# Patient Record
Sex: FEMALE | Race: WHITE | Employment: OTHER | URBAN - METROPOLITAN AREA
[De-identification: names, ages, dates, MRNs, and addresses within clinical notes are randomized per-mention and may not be internally consistent; named-entity substitution may affect disease eponyms.]

---

## 2017-01-25 ENCOUNTER — HOSPITAL ENCOUNTER (OUTPATIENT)
Dept: LAB | Age: 67
Discharge: HOME | End: 2017-01-25
Attending: ORTHOPAEDIC SURGERY
Payer: MEDICARE

## 2017-01-25 LAB — HGB BLD-MCNC: 13.3 G/DL (ref 11.7–15.4)

## 2017-01-25 PROCEDURE — 36415 COLL VENOUS BLD VENIPUNCTURE: CPT | Performed by: ORTHOPAEDIC SURGERY

## 2017-01-25 PROCEDURE — 85018 HEMOGLOBIN: CPT | Performed by: ORTHOPAEDIC SURGERY

## 2017-01-27 ENCOUNTER — ANESTHESIA EVENT (OUTPATIENT)
Dept: SURGERY | Age: 67
End: 2017-01-27
Payer: MEDICARE

## 2017-01-27 RX ORDER — FLUMAZENIL 0.1 MG/ML
0.2 INJECTION INTRAVENOUS
Status: CANCELLED | OUTPATIENT
Start: 2017-01-27

## 2017-01-27 RX ORDER — ONDANSETRON 2 MG/ML
4 INJECTION INTRAMUSCULAR; INTRAVENOUS
Status: CANCELLED | OUTPATIENT
Start: 2017-01-27

## 2017-01-27 RX ORDER — NALBUPHINE HYDROCHLORIDE 20 MG/ML
5 INJECTION, SOLUTION INTRAMUSCULAR; INTRAVENOUS; SUBCUTANEOUS
Status: CANCELLED | OUTPATIENT
Start: 2017-01-27

## 2017-01-27 RX ORDER — HYDROMORPHONE HYDROCHLORIDE 2 MG/ML
0.5 INJECTION, SOLUTION INTRAMUSCULAR; INTRAVENOUS; SUBCUTANEOUS
Status: CANCELLED | OUTPATIENT
Start: 2017-01-27

## 2017-01-27 RX ORDER — SODIUM CHLORIDE 0.9 % (FLUSH) 0.9 %
5-10 SYRINGE (ML) INJECTION AS NEEDED
Status: CANCELLED | OUTPATIENT
Start: 2017-01-27

## 2017-01-27 RX ORDER — NALOXONE HYDROCHLORIDE 0.4 MG/ML
0.1 INJECTION, SOLUTION INTRAMUSCULAR; INTRAVENOUS; SUBCUTANEOUS
Status: CANCELLED | OUTPATIENT
Start: 2017-01-27

## 2017-01-27 RX ORDER — OXYCODONE HYDROCHLORIDE 5 MG/1
5 TABLET ORAL
Status: CANCELLED | OUTPATIENT
Start: 2017-01-27

## 2017-01-27 RX ORDER — MIDAZOLAM HYDROCHLORIDE 1 MG/ML
2 INJECTION, SOLUTION INTRAMUSCULAR; INTRAVENOUS
Status: CANCELLED | OUTPATIENT
Start: 2017-01-27

## 2017-01-30 ENCOUNTER — APPOINTMENT (OUTPATIENT)
Dept: GENERAL RADIOLOGY | Age: 67
End: 2017-01-30
Attending: ORTHOPAEDIC SURGERY
Payer: MEDICARE

## 2017-01-30 ENCOUNTER — HOSPITAL ENCOUNTER (OUTPATIENT)
Age: 67
Setting detail: OUTPATIENT SURGERY
Discharge: HOME | End: 2017-01-30
Attending: ORTHOPAEDIC SURGERY | Admitting: ORTHOPAEDIC SURGERY
Payer: MEDICARE

## 2017-01-30 ENCOUNTER — SURGERY (OUTPATIENT)
Age: 67
End: 2017-01-30

## 2017-01-30 ENCOUNTER — ANESTHESIA (OUTPATIENT)
Dept: SURGERY | Age: 67
End: 2017-01-30
Payer: MEDICARE

## 2017-01-30 VITALS
TEMPERATURE: 97.4 F | SYSTOLIC BLOOD PRESSURE: 124 MMHG | RESPIRATION RATE: 16 BRPM | BODY MASS INDEX: 20.57 KG/M2 | HEIGHT: 66 IN | HEART RATE: 65 BPM | WEIGHT: 128 LBS | DIASTOLIC BLOOD PRESSURE: 59 MMHG | OXYGEN SATURATION: 95 %

## 2017-01-30 PROCEDURE — 77030010507 HC ADH SKN DERMBND J&J -B: Performed by: ORTHOPAEDIC SURGERY

## 2017-01-30 PROCEDURE — C1713 ANCHOR/SCREW BN/BN,TIS/BN: HCPCS | Performed by: ORTHOPAEDIC SURGERY

## 2017-01-30 PROCEDURE — 77030016570 HC BLNKT BAIR HGGR 3M -B: Performed by: ANESTHESIOLOGY

## 2017-01-30 PROCEDURE — 77030029883 HC RETRV SUT ARTHSCP HOFFE BEAT -B: Performed by: ORTHOPAEDIC SURGERY

## 2017-01-30 PROCEDURE — 76210000020 HC REC RM PH II FIRST 0.5 HR: Performed by: ORTHOPAEDIC SURGERY

## 2017-01-30 PROCEDURE — 77030002912 HC SUT ETHBND J&J -A: Performed by: ORTHOPAEDIC SURGERY

## 2017-01-30 PROCEDURE — 76210000063 HC OR PH I REC FIRST 0.5 HR: Performed by: ORTHOPAEDIC SURGERY

## 2017-01-30 PROCEDURE — 77030013392 HC CAP PROTCT PIN JRGN -B: Performed by: ORTHOPAEDIC SURGERY

## 2017-01-30 PROCEDURE — 77030002933 HC SUT MCRYL J&J -A: Performed by: ORTHOPAEDIC SURGERY

## 2017-01-30 PROCEDURE — 77030006690 HC BLD OPHTH BVR BD -B: Performed by: ORTHOPAEDIC SURGERY

## 2017-01-30 PROCEDURE — 77030003602 HC NDL NRV BLK BBMI -B: Performed by: ANESTHESIOLOGY

## 2017-01-30 PROCEDURE — 77030011884 HC TAPE CST PLSTR BSNM -A: Performed by: ORTHOPAEDIC SURGERY

## 2017-01-30 PROCEDURE — 74011250636 HC RX REV CODE- 250/636

## 2017-01-30 PROCEDURE — 74011250636 HC RX REV CODE- 250/636: Performed by: ORTHOPAEDIC SURGERY

## 2017-01-30 PROCEDURE — 77030006689 HC BLD OPHTH BVR BD -A: Performed by: ORTHOPAEDIC SURGERY

## 2017-01-30 PROCEDURE — 76010000162 HC OR TIME 1.5 TO 2 HR INTENSV-TIER 1: Performed by: ORTHOPAEDIC SURGERY

## 2017-01-30 PROCEDURE — 74011000250 HC RX REV CODE- 250

## 2017-01-30 PROCEDURE — 76942 ECHO GUIDE FOR BIOPSY: CPT | Performed by: ORTHOPAEDIC SURGERY

## 2017-01-30 PROCEDURE — 74011250636 HC RX REV CODE- 250/636: Performed by: ANESTHESIOLOGY

## 2017-01-30 PROCEDURE — 76060000034 HC ANESTHESIA 1.5 TO 2 HR: Performed by: ORTHOPAEDIC SURGERY

## 2017-01-30 PROCEDURE — 77030002986 HC SUT PROL J&J -A: Performed by: ORTHOPAEDIC SURGERY

## 2017-01-30 PROCEDURE — 76010010054 HC POST OP PAIN BLOCK: Performed by: ORTHOPAEDIC SURGERY

## 2017-01-30 PROCEDURE — 77030008593 HC TRAP FNGR MESH ALLN -B: Performed by: ORTHOPAEDIC SURGERY

## 2017-01-30 PROCEDURE — 77030036550 HC SLNG ARM PCH S2SG -A: Performed by: ORTHOPAEDIC SURGERY

## 2017-01-30 PROCEDURE — 77030033681 HC SPLNT P-CUT SAF BSNM -A: Performed by: ORTHOPAEDIC SURGERY

## 2017-01-30 PROCEDURE — 77030002916 HC SUT ETHLN J&J -A: Performed by: ORTHOPAEDIC SURGERY

## 2017-01-30 PROCEDURE — 77030020754 HC CUF TRNQT 2BLA STRY -B: Performed by: ORTHOPAEDIC SURGERY

## 2017-01-30 PROCEDURE — 77030018836 HC SOL IRR NACL ICUM -A: Performed by: ORTHOPAEDIC SURGERY

## 2017-01-30 DEVICE — IMPLANTABLE DEVICE: Type: IMPLANTABLE DEVICE | Site: THUMB | Status: FUNCTIONAL

## 2017-01-30 RX ORDER — SODIUM CHLORIDE 0.9 % (FLUSH) 0.9 %
5-10 SYRINGE (ML) INJECTION AS NEEDED
Status: DISCONTINUED | OUTPATIENT
Start: 2017-01-30 | End: 2017-01-30 | Stop reason: HOSPADM

## 2017-01-30 RX ORDER — LIDOCAINE HYDROCHLORIDE 20 MG/ML
INJECTION, SOLUTION EPIDURAL; INFILTRATION; INTRACAUDAL; PERINEURAL AS NEEDED
Status: DISCONTINUED | OUTPATIENT
Start: 2017-01-30 | End: 2017-01-30 | Stop reason: HOSPADM

## 2017-01-30 RX ORDER — LIDOCAINE HYDROCHLORIDE 10 MG/ML
0.1 INJECTION INFILTRATION; PERINEURAL AS NEEDED
Status: DISCONTINUED | OUTPATIENT
Start: 2017-01-30 | End: 2017-01-30 | Stop reason: HOSPADM

## 2017-01-30 RX ORDER — SODIUM CHLORIDE 0.9 % (FLUSH) 0.9 %
5-10 SYRINGE (ML) INJECTION EVERY 8 HOURS
Status: DISCONTINUED | OUTPATIENT
Start: 2017-01-30 | End: 2017-01-30 | Stop reason: HOSPADM

## 2017-01-30 RX ORDER — ONDANSETRON 2 MG/ML
INJECTION INTRAMUSCULAR; INTRAVENOUS AS NEEDED
Status: DISCONTINUED | OUTPATIENT
Start: 2017-01-30 | End: 2017-01-30 | Stop reason: HOSPADM

## 2017-01-30 RX ORDER — PROPOFOL 10 MG/ML
INJECTION, EMULSION INTRAVENOUS
Status: DISCONTINUED | OUTPATIENT
Start: 2017-01-30 | End: 2017-01-30 | Stop reason: HOSPADM

## 2017-01-30 RX ORDER — MIDAZOLAM HYDROCHLORIDE 1 MG/ML
2 INJECTION, SOLUTION INTRAMUSCULAR; INTRAVENOUS ONCE
Status: COMPLETED | OUTPATIENT
Start: 2017-01-30 | End: 2017-01-30

## 2017-01-30 RX ORDER — CEFAZOLIN SODIUM IN 0.9 % NACL 2 G/50 ML
2 INTRAVENOUS SOLUTION, PIGGYBACK (ML) INTRAVENOUS ONCE
Status: COMPLETED | OUTPATIENT
Start: 2017-01-30 | End: 2017-01-30

## 2017-01-30 RX ORDER — FENTANYL CITRATE 50 UG/ML
100 INJECTION, SOLUTION INTRAMUSCULAR; INTRAVENOUS ONCE
Status: COMPLETED | OUTPATIENT
Start: 2017-01-30 | End: 2017-01-30

## 2017-01-30 RX ORDER — SODIUM CHLORIDE, SODIUM LACTATE, POTASSIUM CHLORIDE, CALCIUM CHLORIDE 600; 310; 30; 20 MG/100ML; MG/100ML; MG/100ML; MG/100ML
100 INJECTION, SOLUTION INTRAVENOUS CONTINUOUS
Status: DISCONTINUED | OUTPATIENT
Start: 2017-01-30 | End: 2017-01-30 | Stop reason: HOSPADM

## 2017-01-30 RX ORDER — PROPOFOL 10 MG/ML
INJECTION, EMULSION INTRAVENOUS AS NEEDED
Status: DISCONTINUED | OUTPATIENT
Start: 2017-01-30 | End: 2017-01-30 | Stop reason: HOSPADM

## 2017-01-30 RX ADMIN — ONDANSETRON 4 MG: 2 INJECTION INTRAMUSCULAR; INTRAVENOUS at 10:15

## 2017-01-30 RX ADMIN — PROPOFOL 50 MG: 10 INJECTION, EMULSION INTRAVENOUS at 09:24

## 2017-01-30 RX ADMIN — CEFAZOLIN 2 G: 1 INJECTION, POWDER, FOR SOLUTION INTRAMUSCULAR; INTRAVENOUS; PARENTERAL at 09:25

## 2017-01-30 RX ADMIN — PROPOFOL 100 MCG/KG/MIN: 10 INJECTION, EMULSION INTRAVENOUS at 09:24

## 2017-01-30 RX ADMIN — MIDAZOLAM HYDROCHLORIDE 2 MG: 1 INJECTION, SOLUTION INTRAMUSCULAR; INTRAVENOUS at 08:46

## 2017-01-30 RX ADMIN — FENTANYL CITRATE 50 MCG: 50 INJECTION, SOLUTION INTRAMUSCULAR; INTRAVENOUS at 08:46

## 2017-01-30 RX ADMIN — SODIUM CHLORIDE, SODIUM LACTATE, POTASSIUM CHLORIDE, AND CALCIUM CHLORIDE 100 ML/HR: 600; 310; 30; 20 INJECTION, SOLUTION INTRAVENOUS at 07:45

## 2017-01-30 RX ADMIN — LIDOCAINE HYDROCHLORIDE 40 MG: 20 INJECTION, SOLUTION EPIDURAL; INFILTRATION; INTRACAUDAL; PERINEURAL at 09:24

## 2017-01-30 RX ADMIN — PROPOFOL 20 MG: 10 INJECTION, EMULSION INTRAVENOUS at 09:37

## 2017-01-30 NOTE — ANESTHESIA PROCEDURE NOTES
Peripheral Block    Start time: 1/30/2017 8:49 AM  End time: 1/30/2017 8:50 AM  Performed by: David Vaughn  Authorized by: David Vaughn       Pre-procedure:    Indications: at surgeon's request and post-op pain management    Preanesthetic Checklist: patient identified, risks and benefits discussed, site marked, timeout performed, anesthesia consent given and patient being monitored    Timeout Time: 08:46          Block Type:   Block Type:  Axillary  Laterality:  Left  Monitoring:  Standard ASA monitoring, continuous pulse ox, frequent vital sign checks, heart rate, responsive to questions and oxygen  Injection Technique:  Single shot  Procedures: ultrasound guided and nerve stimulator    Patient Position: seated  Prep: chlorhexidine    Location:  Axilla  Needle Type:  Stimuplex  Needle Gauge:  22 G  Needle Localization:  Nerve stimulator and ultrasound guidance  Motor Response: minimal motor response >0.4 mA    Medication Injected:  0.5%  ropivacaine  Adds:  Epi 1:200K  Volume (mL):  30    Assessment:  Number of attempts:  1  Injection Assessment:  Incremental injection every 5 mL, local visualized surrounding nerve on ultrasound, negative aspiration for CSF, negative aspiration for blood, no paresthesia, no intravascular symptoms and ultrasound image on chart  Patient tolerance:  Patient tolerated the procedure well with no immediate complications

## 2017-01-30 NOTE — IP AVS SNAPSHOT
[address] 
[phone] Patient: Ursula Bianchi MRN: [id number] KTR:[id number]/72/[id number] You are allergic to the following Allergen Reactions Pcn (Penicillins) Hives Sulfa (Sulfonamide Antibiotics) Hives Recent Documentation Height Weight BMI OB Status Smoking Status 1.676 m 58.1 kg 20.66 kg/m2 Postmenopausal Never Smoker Emergency Contacts Name Discharge Info Relation Home Work Mobile Adalberto Rocha  Spouse [3] [phone] About your hospitalization You were admitted on:  January 30, 2017 You last received care in the:  [facility] OP PACU You were discharged on:  January 30, 2017 Unit phone number:  [phone] Why you were hospitalized Your primary diagnosis was:  Not on File Providers Seen During Your Hospitalizations Provider Role Specialty Primary office phone [place] MD Francis Attending Provider Orthopedic Surgery [phone] Your Primary Care Physician (PCP) Primary Care Physician Office Phone Office Fax [facility] [phone] Follow-up Information Follow up With Details Comments Contact Info Cali Goins MD   [address] [place] [id number] [phone] Your Appointments Tuesday February 21, 2017  9:00 AM EST  
SC OT INITIAL VISIT HAND with Tiffany Camacho OT  
[facility] ([address] [place] [address]) [address] [place] [id number] [phone] Please arrive 10-15 minutes prior to your appointment time. Wear comfortable clothing. Please bring your insurance cards with you. Please bring a list of your medications including herbal supplements. Please bring a list of your allergies including food allergies. Monday February 27, 2017  1:15 PM EST New Patient with Enriqueta Walters MD  
 [facility] [address] [facility]) [address]  
[phone] Friday March 10, 2017  9:30 AM EST  
DEXA BONE DENSITY STDY AXIAL with S[facility] DEXA BI GE LUNAR DEXA [address] [facility] ([facility] [facility] [address]) [address] [place] [address]  
[phone] MEDICATIONS -  Patient must bring a complete list of all medications currently taking to include prescriptions, over-the-counter meds, herbals, vitamins & any dietary supplements -  Patient must discontinue use of calcium, vitamins, or calcium supplements including antacids (calcium based) 24 hours before scan. GENERAL INSTRUCTIONS - Men should wear a jogging suit with NO metal.  Women should wear a sports bra with NO metal as well as clothes with no metal or buttons. PATIENT ARRIVAL -  Please report 15 minutes early to check in  
  
    
  
  
 
  
  
  
Current Discharge Medication List  
  
CONTINUE these medications which have CHANGED Dose & Instructions Dispensing Information Comments Morning Noon Evening Bedtime ALPRAZolam 0.5 mg tablet Commonly known as:  Rebbeca Lawn What changed:  when to take this Your next dose is: Today, Tomorrow Other:  _________ Dose:  0.5 mg Take 1 Tab by mouth nightly as needed for Anxiety. Max Daily Amount: 0.5 mg.  
 Quantity:  90 Tab Refills:  1  
     
   
   
   
  
 nabumetone 500 mg tablet Commonly known as:  RELAFEN What changed:   
- when to take this 
- reasons to take this 
- additional instructions Your next dose is: Today, Tomorrow Other:  _________ Dose:  500 mg Take 1 Tab by mouth two (2) times a day. Take with food for arthritis pain  Indications: OSTEOARTHRITIS Quantity:  180 Tab Refills:  4  
     
   
   
   
  
 sertraline 100 mg tablet Commonly known as:  ZOLOFT What changed:  when to take this Your next dose is: Today, Tomorrow Other:  _________ Dose:  100 mg Take 1 Tab by mouth daily. Indications: ANXIETY WITH DEPRESSION Quantity:  90 Tab Refills:  3 CONTINUE these medications which have NOT CHANGED Dose & Instructions Dispensing Information Comments Morning Noon Evening Bedtime  
 acyclovir 400 mg tablet Commonly known as:  ZOVIRAX Your next dose is: Today, Tomorrow Other:  _________ Dose:  400 mg Take 1 Tab by mouth two (2) times a day. Quantity:  180 Tab Refills:  3  
     
   
   
   
  
 alendronate 70 mg tablet Commonly known as:  FOSAMAX Your next dose is: Today, Tomorrow Other:  _________ Dose:  70 mg Take 1 Tab by mouth every seven (7) days. Quantity:  12 Tab Refills:  3 CALCIUM 600 + D 600-125 mg-unit Tab Generic drug:  calcium-cholecalciferol (d3) Your next dose is: Today, Tomorrow Other:  _________ Take  by mouth daily. Hold all vitamins for 7 days prior to surgery per anesthesia protocol Refills:  0  
     
   
   
   
  
 cholecalciferol 1,000 unit Cap Commonly known as:  VITAMIN D3 Your next dose is: Today, Tomorrow Other:  _________ Dose:  1000 Units Take 1,000 Units by mouth two (2) times a day. Hold all vitamins for 7 days prior to surgery per anesthesia protocol Refills:  0 Iron 325 mg (65 mg iron) tablet Generic drug:  ferrous sulfate Your next dose is: Today, Tomorrow Other:  _________ Dose:  325 mg Take 325 mg by mouth Daily (before breakfast). Refills:  0  
     
   
   
   
  
 levothyroxine 88 mcg tablet Commonly known as:  SYNTHROID Your next dose is: Today, Tomorrow Other:  _________ Dose:  88 mcg Take 1 Tab by mouth Daily (before breakfast). Indications: hypothyroidism Quantity:  90 Tab Refills:  4 Discharge Instructions ACTIVITY · As tolerated and as directed by your doctor. · Bathe or shower as directed by your doctor. DIET · Clear liquids until no nausea or vomiting; then light diet for the first day. · Advance to regular diet on second day, unless your doctor orders otherwise. · If nausea and vomiting continues, call your doctor. PAIN 
· Take pain medication as directed by your doctor. · Call your doctor if pain is NOT relieved by medication. · DO NOT take aspirin of blood thinners unless directed by your doctor. DRESSING CARE  
 
 
 
YOUR DOCTOR'S PHONE NUMBER #[phone] DISCHARGE SUMMARY from Nurse PATIENT INSTRUCTIONS: 
 
After general anesthesia or intravenous sedation, for 24 hours or while taking prescription Narcotics: · Limit your activities · Do not drive and operate hazardous machinery · Do not make important personal or business decisions · Do  not drink alcoholic beverages · If you have not urinated within 8 hours after discharge, please contact your surgeon on call. *  Please give a list of your current medications to your Primary Care Provider. *  Please update this list whenever your medications are discontinued, doses are 
    changed, or new medications (including over-the-counter products) are added. *  Please carry medication information at all times in case of emergency situations. These are general instructions for a healthy lifestyle: No smoking/ No tobacco products/ Avoid exposure to second hand smoke Surgeon General's Warning:  Quitting smoking now greatly reduces serious risk to your health. Obesity, smoking, and sedentary lifestyle greatly increases your risk for illness A healthy diet, regular physical exercise & weight monitoring are important for maintaining a healthy lifestyle You may be retaining fluid if you have a history of heart failure or if you experience any of the following symptoms:  Weight gain of 3 pounds or more overnight or 5 pounds in a week, increased swelling in our hands or feet or shortness of breath while lying flat in bed. Please call your doctor as soon as you notice any of these symptoms; do not wait until your next office visit. Recognize signs and symptoms of STROKE: 
 
F-face looks uneven A-arms unable to move or move unevenly S-speech slurred or non-existent T-time-call 911 as soon as signs and symptoms begin-DO NOT go Back to bed or wait to see if you get better-TIME IS BRAIN. Keep splint clean, dry and intact until see in office. Do not scrub incision or submerge under water. Move fingers, elevate, and ice to prevent swelling. No heavy lifting. Discharge Orders None Introducing [facility]! Dear Carrie Pineda: 
Thank you for requesting a [employer] account. Our records indicate that you already have an active Mapittrack[employer] account. You can access your account anytime at [email or web address] Did you know that you can access your hospital and ER discharge instructions at any time in Mapittrackit? You can also review all of your test results from your hospital stay or ER visit. Additional Information If you have questions, please visit the Frequently Asked Questions section of the [employer] website at [email or web address] Remember, MyChart is NOT to be used for urgent needs. For medical emergencies, dial 911. Now available from your iPhone and Android! General Information Please provide this summary of care documentation to your next provider. Patient Signature:  ____________________________________________________________ Date:  ____________________________________________________________  
  
Lompico Candelaria Provider Signature:  ____________________________________________________________ Date:  ____________________________________________________________

## 2017-01-30 NOTE — ANESTHESIA POSTPROCEDURE EVALUATION
Post-Anesthesia Evaluation and Assessment    Patient: Dmitry Vargas MRN: [id number]  SSN: [id number]    YOB: 1950  Age: 78 y.o. Sex: female       Cardiovascular Function/Vital Signs  Visit Vitals    /59    Pulse 65    Temp 36.3 °C (97.4 °F)    Resp 16    Ht 5' 6\" (1.676 m)    Wt 58.1 kg (128 lb)    SpO2 95%    BMI 20.66 kg/m2       Patient is status post total IV anesthesia, regional anesthesia for Procedure(s):  LEFT THUMB ARTHROPLASTY / PLEXUS. Nausea/Vomiting: None    Postoperative hydration reviewed and adequate. Pain:  Pain Scale 1: Numeric (0 - 10) (01/30/17 1125)  Pain Intensity 1: 0 (01/30/17 1125)   Managed    Neurological Status:   Neuro (WDL): Within Defined Limits (01/30/17 1125)   At baseline    Mental Status and Level of Consciousness: Arousable    Pulmonary Status:   O2 Device: Room air (01/30/17 1125)   Adequate oxygenation and airway patent    Complications related to anesthesia: None    Post-anesthesia assessment completed.  No concerns    Signed By: Vilma Zimmerman MD     January 30, 2017

## 2017-01-30 NOTE — ANESTHESIA PREPROCEDURE EVALUATION
Anesthetic History   No history of anesthetic complications            Review of Systems / Medical History  Patient summary reviewed and pertinent labs reviewed    Pulmonary  Within defined limits                 Neuro/Psych   Within defined limits           Cardiovascular              Hyperlipidemia    Exercise tolerance: >4 METS     GI/Hepatic/Renal  Within defined limits              Endo/Other      Hypothyroidism: well controlled  Arthritis     Other Findings              Physical Exam    Airway  Mallampati: II  TM Distance: < 4 cm  Neck ROM: normal range of motion   Mouth opening: Normal     Cardiovascular  Regular rate and rhythm,  S1 and S2 normal,  no murmur, click, rub, or gallop  Rhythm: regular  Rate: normal         Dental  No notable dental hx       Pulmonary  Breath sounds clear to auscultation               Abdominal  GI exam deferred       Other Findings            Anesthetic Plan    ASA: 2  Anesthesia type: total IV anesthesia and regional - brachial plexus block      Post-op pain plan if not by surgeon: peripheral nerve block single    Induction: Intravenous  Anesthetic plan and risks discussed with: Patient and Spouse

## 2017-01-30 NOTE — DISCHARGE INSTRUCTIONS
ACTIVITY  · As tolerated and as directed by your doctor. · Bathe or shower as directed by your doctor. DIET  · Clear liquids until no nausea or vomiting; then light diet for the first day. · Advance to regular diet on second day, unless your doctor orders otherwise. · If nausea and vomiting continues, call your doctor. PAIN  · Take pain medication as directed by your doctor. · Call your doctor if pain is NOT relieved by medication. · DO NOT take aspirin of blood thinners unless directed by your doctor. DRESSING CARE       CALL YOUR DOCTOR IF   · Excessive bleeding that does not stop after holding pressure over the area  · Temperature of 101 degrees F or above  · Excessive redness, swelling or bruising, and/ or green or yellow, smelly discharge from incision    AFTER ANESTHESIA   · For the first 24 hours: DO NOT Drive, Drink alcoholic beverages, or Make important decisions. · Be aware of dizziness following anesthesia and while taking pain medication. APPOINTMENT DATE/ TIME    Jan 30 @ 730am    YOUR DOCTOR'S PHONE NUMBER #[phone]      DISCHARGE SUMMARY from Nurse    PATIENT INSTRUCTIONS:    After general anesthesia or intravenous sedation, for 24 hours or while taking prescription Narcotics:  · Limit your activities  · Do not drive and operate hazardous machinery  · Do not make important personal or business decisions  · Do  not drink alcoholic beverages  · If you have not urinated within 8 hours after discharge, please contact your surgeon on call. *  Please give a list of your current medications to your Primary Care Provider. *  Please update this list whenever your medications are discontinued, doses are      changed, or new medications (including over-the-counter products) are added. *  Please carry medication information at all times in case of emergency situations.       These are general instructions for a healthy lifestyle:    No smoking/ No tobacco products/ Avoid exposure to second hand smoke    Surgeon General's Warning:  Quitting smoking now greatly reduces serious risk to your health. Obesity, smoking, and sedentary lifestyle greatly increases your risk for illness    A healthy diet, regular physical exercise & weight monitoring are important for maintaining a healthy lifestyle    You may be retaining fluid if you have a history of heart failure or if you experience any of the following symptoms:  Weight gain of 3 pounds or more overnight or 5 pounds in a week, increased swelling in our hands or feet or shortness of breath while lying flat in bed. Please call your doctor as soon as you notice any of these symptoms; do not wait until your next office visit. Recognize signs and symptoms of STROKE:    F-face looks uneven    A-arms unable to move or move unevenly    S-speech slurred or non-existent    T-time-call 911 as soon as signs and symptoms begin-DO NOT go       Back to bed or wait to see if you get better-TIME IS BRAIN. Keep splint clean, dry and intact until see in office. Do not scrub incision or submerge under water. Move fingers, elevate, and ice to prevent swelling. No heavy lifting.

## 2017-01-30 NOTE — IP AVS SNAPSHOT
Current Discharge Medication List  
  
Take these medications at their scheduled times Dose & Instructions Dispensing Information Comments Morning Noon Evening Bedtime  
 acyclovir 400 mg tablet Commonly known as:  ZOVIRAX Your next dose is: Today, Tomorrow Other:  ____________ Dose:  400 mg Take 1 Tab by mouth two (2) times a day. Quantity:  180 Tab Refills:  3  
     
   
   
   
  
 alendronate 70 mg tablet Commonly known as:  FOSAMAX Your next dose is: Today, Tomorrow Other:  ____________ Dose:  70 mg Take 1 Tab by mouth every seven (7) days. Quantity:  12 Tab Refills:  3 CALCIUM 600 + D 600-125 mg-unit Tab Generic drug:  calcium-cholecalciferol (d3) Your next dose is: Today, Tomorrow Other:  ____________ Take  by mouth daily. Hold all vitamins for 7 days prior to surgery per anesthesia protocol Refills:  0  
     
   
   
   
  
 cholecalciferol 1,000 unit Cap Commonly known as:  VITAMIN D3 Your next dose is: Today, Tomorrow Other:  ____________ Dose:  1000 Units Take 1,000 Units by mouth two (2) times a day. Hold all vitamins for 7 days prior to surgery per anesthesia protocol Refills:  0 Iron 325 mg (65 mg iron) tablet Generic drug:  ferrous sulfate Your next dose is: Today, Tomorrow Other:  ____________ Dose:  325 mg Take 325 mg by mouth Daily (before breakfast). Refills:  0  
     
   
   
   
  
 levothyroxine 88 mcg tablet Commonly known as:  SYNTHROID Your next dose is: Today, Tomorrow Other:  ____________ Dose:  88 mcg Take 1 Tab by mouth Daily (before breakfast). Indications: hypothyroidism Quantity:  90 Tab Refills:  4  
     
   
   
   
  
 nabumetone 500 mg tablet Commonly known as:  RELAFEN Your next dose is: Today, Tomorrow Other:  ____________ Dose:  500 mg Take 1 Tab by mouth two (2) times a day. Take with food for arthritis pain  Indications: OSTEOARTHRITIS Quantity:  180 Tab Refills:  4  
     
   
   
   
  
 sertraline 100 mg tablet Commonly known as:  ZOLOFT Your next dose is: Today, Tomorrow Other:  ____________ Dose:  100 mg Take 1 Tab by mouth daily. Indications: ANXIETY WITH DEPRESSION Quantity:  90 Tab Refills:  3 Take these medications as needed Dose & Instructions Dispensing Information Comments Morning Noon Evening Bedtime ALPRAZolam 0.5 mg tablet Commonly known as:  Volanda Pronto Your next dose is: Today, Tomorrow Other:  ____________ Dose:  0.5 mg Take 1 Tab by mouth nightly as needed for Anxiety. Max Daily Amount: 0.5 mg.  
 Quantity:  90 Tab Refills:  1

## 2017-01-30 NOTE — BRIEF OP NOTE
BRIEF OPERATIVE NOTE    Date of Procedure: 1/30/2017   Preoperative Diagnosis: Primary osteoarthritis of first carpometacarpal joint of left hand [M18.12]  Postoperative Diagnosis: Primary osteoarthritis of first carpometacarpal joint of left hand [M18.12]    Procedure(s):  LEFT THUMB ARTHROPLASTY / PLEXUS  LEFT THUMB MP HYPEREXTENSION  Surgeon(s) and Role:     * Barry Warner MD - Primary            Surgical Staff:  Circ-1: Kiley Ashby RN  Circ-2: Micah Hogue. Lashae Cardoso RN  Radiology Technician: Holden Crenshaw RT, Hugo WYNN Case, RT, R, CT  Scrub Tech-1: Silvano Class  Event Time In   Incision Start 0930   Incision Close 1058     Anesthesia: Regional   Estimated Blood Loss: MINIMAL  Specimens: * No specimens in log *   Findings: SEE DICTATION   Complications: NONE  Implants:   Implant Name Type Inv. Item Serial No.  Lot No. LRB No. Used Action   ANCHOR SUT 4/0 ETH P-3 W/BIT -- MICRO QA+ - [id number]  ANCHOR SUT 4/0 ETH P-3 W/BIT -- MICRO QA+  [employer]ITE[employer] [id number] Left 1 Implanted   . 062 K-WIRE ([id number]. 9CM)       María Augustine [id number] Left 1 Implanted

## 2017-02-01 NOTE — OP NOTES
Operative Report         Preoperative diagnosis:  Primary osteoarthritis of first carpometacarpal joint of left hand [M18.12]    Postoperative diagnosis: Primary osteoarthritis of first carpometacarpal joint of left hand [M18.12]    Surgeon(s) and Role:     * Hollister MD Francis - Primary     Anesthesia: Regional Local with MAC. Procedures: Procedure(s):  LEFT THUMB ARTHROPLASTY / PLEXUS   Left thumb APL ligament reconstruction  Left thumb MP joint volar capsulodesis utilizing Mitech suture anchor    EBL/IV FLUIDS: Per Anesthesia. COMPLICATIONS: None. DISPOSITION: Stable to recovery room. INDICATIONS FOR PROCEDURE: The patient is a pleasant 62-year-old Female with history of left Thumb arthritis and MP joint hyperextensionthat has failed nonoperative measures. After both operative and nonoperative treatment options were discussed, the decision was made to go ahead with a Left thumb arthroplasty, left thumb APL ligament reconstruction, possible left thumb volar MP joint capsulodesis. Risks and benefits of the procedure were discussed including, but not limited to bleeding, infection, injury to adjacent structures consisting of tendon, artery, and nerve, need for additional procedures, wound dehiscence, scar formation, incomplete resolution of symptoms, Thumb CMC subsidence, possible need for MP joint fusion which she did not want to perform today,recurrence of symptoms, and transient neuropraxia. Informed consent was obtained. PROCEDURE IN DETAIL: The patient was seen and marked in the preoperative suite. The patient was taken back to the OR, placed on the table in supine position with Left upper extremities on hand tables. Left upper extremities were prepped and draped in standard sterile fashion. A formal timeout was performed confirming patient identification, preoperative antibiotics, and planned operative procedure.     We turned our attention to the first portion of the procedure the thumb arthroplasty. A standard incision was made over the [facility] joint. We circumferentially dissected out the [facility] joint specifically the trapezium we had to excise the trapezium in piecemeal manner since it was so degenerated and then. The trapezium was excised in its entirety we irrigated copiously with normal saline. We checked the STT joint there was not significant arthritis so we did not have to perform a partial trapezoid ectomy. Next under separate procedure we performed the APL ligament reconstruction. We harvested the APL in standard fashion making secondary incision proximally leaving it distally attached we drilled through the proximal metacarpal of the thumb passing the tendon and then through the proximal index metacarpal passing the tendon and tying it back via a Pulvertaft weave Into the ECRB. We then pinned the thumb metacarpal to prevent subsidence. After we got her out to length we assessed her MP hyperextension was not acceptable. We then turned our attention to the third portion of the procedure. Next under separate incision we performed a mid axial incision to the radial side of the MP joint we protected the digital nerve and flexor tendons we placed the Mytec suture anchor proximal to the volar capsule releasing the proximal edge of the volar capsule and then tying it back in a tighter fashion utilizing the anchor. This tightened up her volar capsule well performing a capsulodesis. We irrigated all incisions we closed with Monocryl subcutaneously on some and a running subcuticular Prolene and Dermabond glue. Soft sterile dressing was placed the pin was cut well padded she was placed into a thumb spica splint the tourniquet was let down and the patient was taken to the recovery room having tolerated procedure well. Postoperative care and follow-up in 2 weeks for suture removal transition to a removable thumb spica splint.     Closure: Primary    Complications: None     Signed By: Torrey Murillo MD

## 2017-02-21 ENCOUNTER — HOSPITAL ENCOUNTER (OUTPATIENT)
Dept: PHYSICAL THERAPY | Age: 67
Discharge: HOME | End: 2017-02-21
Payer: MEDICARE

## 2017-02-21 PROCEDURE — 97165 OT EVAL LOW COMPLEX 30 MIN: CPT

## 2017-02-21 PROCEDURE — G8985 CARRY GOAL STATUS: HCPCS

## 2017-02-21 PROCEDURE — G8984 CARRY CURRENT STATUS: HCPCS

## 2017-02-21 NOTE — PROGRESS NOTES
Ambulatory/Rehab Services H2 Model Falls Risk Assessment    Risk Factor Pts. ·   Confusion/Disorientation/Impulsivity  []    4 ·   Symptomatic Depression  []   2 ·   Altered Elimination  []   1 ·   Dizziness/Vertigo  []   1 ·   Gender (Male)  []   1 ·   Any administered antiepileptics (anticonvulsants):  []   2 ·   Any administered benzodiazepines:  []   1 ·   Visual Impairment (specify):  []   1 ·   Portable Oxygen Use  []   1 ·   Orthostatic ? BP  []   1 ·   History of Recent Falls (within 3 mos.)  []   5     Ability to Rise from Chair (choose one) Pts. ·   Ability to rise in a single movement  [x]   0 ·   Pushes up, successful in one attempt  []   1 ·   Multiple attempts, but successful  []   3 ·   Unable to rise without assistance  []   4   Total: (5 or greater = High Risk) 0     Falls Prevention Plan:   []                Physical Limitations to Exercise (specify):   []                Mobility Assistance Device (type):   []                Exercise/Equipment Adaptation (specify):    ©2010 [facility] Patent #[id number].  Federal Law prohibits the replication, distribution or use without written permission from [facility]

## 2017-02-21 NOTE — PROGRESS NOTES
Abbi Cameron  : 1950 [facility] [place] [address], [place] [address].  [phone]   [phone]         OUTPATIENT OCCUPATIONAL THERAPY: Initial Assessment 2017    ICD-10: Treatment Diagnosis: Pain in left hand (M79.642)Stiffness of left hand, not elsewhere classified (M25.642)  Precautions/Allergies:   Pcn [penicillins] and Sulfa (sulfonamide antibiotics)   Fall Risk Score: 0 (? 5 = High Risk)  MD Orders: Evaluate and treat: fabricate CMC splint MEDICAL/REFERRING DIAGNOSIS:   Unilateral primary osteoarthritis of first carpometacarpal joint, left hand [M18.12]   DATE OF ONSET: several years ago   REFERRING PHYSICIAN: Lien Estrada MD  RETURN PHYSICIAN APPOINTMENT: 4 weeks     INITIAL ASSESSMENT:  Ms. Maritza Forrester presents with decreased functional use, strength and range of motion of her left hand and upper extremity that is affecting her independence with activities of daily living and ability to perform job tasks. I feel that Ms. Maritza Forrester will benefit from skilled occupational therapy to maximize the functional use of her hand and upper extremity in daily activities and work tasks. PLAN OF CARE:   PROBLEM LIST:  1. Pain in left hand. 2. Decreased motion in left hand. 3. Decreased strength in left hand. INTERVENTIONS PLANNED:  1. Modalities that may include fluidotherapy, paraffin, ultrasound, and light therapy. 2. Therapeutic exercise including a home exercise program.  3. Manual therapy. 4. Therapeutic activities. TREATMENT PLAN:  Effective Dates: 2017 TO 2017. Frequency/Duration: 2 times a week for 10 weeks  GOALS: (Goals have been discussed and agreed upon with patient.)  Short-Term Functional Goals: Time Frame: 4 weeks  1. Decrease pain to 5 to allow patient to perform self care tasks. 2. Increase motion in left hand by 20 degrees to improve functional use of upper extremity in ADL activities.   3. Increase strength in left hand by 10 pounds to allow patient to  and lift objects during self care activities. Discharge Goals: Time Frame: 12 weeks  1. Decrease pain to 2 to allow patient to perform all household  tasks. 2. Increase motion in left hand by 30 degreees to allow patient to perform all ADL activities. 3. Increase strength in left hand by 20 pounds to allow patient to , lift, hold, and carry heavy objects. Rehabilitation Potential For Stated Goals: Good  Regarding James Rocha's therapy, I certify that the treatment plan above will be carried out by a therapist or under their direction. Thank you for this referral,  Pushpa Campos, OT       Referring Physician Signature: Edwin Porter MD _________________________  Date _________            The information in this section was collected on 2/21/2017 (except where otherwise noted). OCCUPATIONAL PROFILE & HISTORY:   History of Present Injury/Illness (Reason for Referral): The patient has had increasing pain in her left thumb for several years. Past Medical History/Comorbidities:   Ms. Alexey Parker  has a past medical history of Anemia (2015); Anxiety (5/26/2015); Chronic insomnia (5/26/2015); Chronic pain; Depression (5/26/2015); Genital herpes, unspecified (5/26/2015); Hypertriglyceridemia (5/26/2015); Hypothyroidism (5/26/2015); Osteoarthritis (5/26/2015); Sciatica (5/26/2015); and Sleep related leg cramps (5/26/2015). Ms. Alexey Parker  has a past surgical history that includes dilation and curettage (2006). Social History/Living Environment:   Home Environment: Private residence  Prior Level of Function/Work/Activity:  Independent    Current Medications:    Current Outpatient Prescriptions:     calcium-cholecalciferol, d3, (CALCIUM 600 + D) 600-125 mg-unit tab, Take  by mouth daily. Hold all vitamins for 7 days prior to surgery per anesthesia protocol, Disp: , Rfl:     levothyroxine (SYNTHROID) 88 mcg tablet, Take 1 Tab by mouth Daily (before breakfast).  Indications: hypothyroidism, Disp: 90 Tab, Rfl: 4    sertraline (ZOLOFT) 100 mg tablet, Take 1 Tab by mouth daily. Indications: ANXIETY WITH DEPRESSION (Patient taking differently: Take 100 mg by mouth every evening. Indications: ANXIETY WITH DEPRESSION), Disp: 90 Tab, Rfl: 3    alendronate (FOSAMAX) 70 mg tablet, Take 1 Tab by mouth every seven (7) days. , Disp: 12 Tab, Rfl: 3    ALPRAZolam (XANAX) 0.5 mg tablet, Take 1 Tab by mouth nightly as needed for Anxiety. Max Daily Amount: 0.5 mg. (Patient taking differently: Take 0.5 mg by mouth daily as needed for Anxiety.), Disp: 90 Tab, Rfl: 1    acyclovir (ZOVIRAX) 400 mg tablet, Take 1 Tab by mouth two (2) times a day., Disp: 180 Tab, Rfl: 3    cholecalciferol (VITAMIN D3) 1,000 unit cap, Take 1,000 Units by mouth two (2) times a day. Hold all vitamins for 7 days prior to surgery per anesthesia protocol, Disp: , Rfl:     nabumetone (RELAFEN) 500 mg tablet, Take 1 Tab by mouth two (2) times a day. Take with food for arthritis pain  Indications: OSTEOARTHRITIS (Patient taking differently: Take 500 mg by mouth as needed. Hold all vitamins for 7 days prior to surgery per anesthesia protocol  Indications: OSTEOARTHRITIS), Disp: 180 Tab, Rfl: 4    ferrous sulfate (IRON) 325 mg (65 mg iron) tablet, Take 325 mg by mouth Daily (before breakfast). , Disp: , Rfl:    Date Last Reviewed:  2/21/2017    Number of medical conditions (excluding presenting problem) that affect the Plan of Care: Brief history (0):  LOW COMPLEXITY   ASSESSMENT OF OCCUPATIONAL PERFORMANCE:   RANGE OF MOTION:     · AROM:       Measurements not taken due to recent surgery. STRENGTH: Not tested yet. SENSATION:             Physical Skills Involved:  1. Range of Motion  2. Strength  3. Fine or Gross Motor Coordination  4. Sensation  5. Dexterity Cognitive Skills Affected (resulting in the inability to perform in a timely and safe manner):   1. none Psychosocial Skills Affected:  1. none   Number of elements that affect the Plan of Care: 1-3:  LOW COMPLEXITY   CLINICAL DECISION MAKING:   Outcome Measure: Tool Used: Disabilities of the Arm, Shoulder and Hand (DASH) Questionnaire - Quick Version  Score:  Initial: 43/55  Most Recent: X/55 (Date: -- )   Interpretation of Score: The DASH is designed to measure the activities of daily living in person's with upper extremity dysfunction or pain. Each section is scored on a 1-5 scale, 5 representing the greatest disability. The scores of each section are added together for a total score of 55. Score 11 12-19 20-28 29-37 38-45 46-54 55   Modifier CH CI CJ CK CL CM CN     ? Carrying, Moving, and Handling Objects:     - CURRENT STATUS: CL - 60%-79% impaired, limited or restricted    - GOAL STATUS: CJ - 20%-39% impaired, limited or restricted    - D/C STATUS:  ---------------To be determined---------------    Medical Necessity:   · Patient is expected to demonstrate progress in strength and range of motion to decrease assistance required with ADL and household activities. .  Reason for Services/Other Comments:  · The patient has limited motion ,strength and function in her left hand. .  Clinical Decision-Making Assessment:     Clinical Decision-Making: LOW COMPLEXITY   TREATMENT:   (In addition to Assessment/Re-Assessment sessions the following treatments were rendered)  Pre-treatment Symptoms/Complaints:    Pain: Initial:   Pain Intensity 1: 3 (increasing to 10 when most intense)  Pain Location 1: Hand  Pain Orientation 1: Left  Post Session:  3     The patient was instructed in a home exercise program  The patient was provided with a removable CMC splint. Treatment/Session Assessment:    · Response to Treatment:  Patients tolerated treatment well with no complications. Upon completion of treatment, skin condition was normal..  · Compliance with Program/Exercises: Will assess as treatment progresses.   · Recommendations/Intent for next treatment session: \"Next visit will focus on advancements to more challenging activities\". Adjustments will be made on her splint if neeeded to improve fit or comfort.   Total Treatment Duration:  OT Patient Time In/Time Out  Time In: 0945  Time Out: [address], [place]

## 2017-02-23 ENCOUNTER — HOSPITAL ENCOUNTER (OUTPATIENT)
Dept: PHYSICAL THERAPY | Age: 67
Discharge: HOME | End: 2017-02-23
Payer: MEDICARE

## 2017-02-23 PROCEDURE — 97110 THERAPEUTIC EXERCISES: CPT

## 2017-02-23 PROCEDURE — 97018 PARAFFIN BATH THERAPY: CPT

## 2017-02-23 PROCEDURE — 97140 MANUAL THERAPY 1/> REGIONS: CPT

## 2017-02-27 NOTE — PROGRESS NOTES
Viky Tello  : 1950 [facility] [place] [address], [address] [place] [address]  [phone]   [phone]         OUTPATIENT OCCUPATIONAL THERAPY: Daily Note 2017    ICD-10: Treatment Diagnosis: Pain in left hand (M79.642)Stiffness of left hand, not elsewhere classified (M25.642)  Precautions/Allergies:   Pcn [penicillins] and Sulfa (sulfonamide antibiotics)   Fall Risk Score: 0 (? 5 = High Risk)  MD Orders: Evaluate and treat: fabricate CMC splint MEDICAL/REFERRING DIAGNOSIS:   Unilateral primary osteoarthritis of first carpometacarpal joint, left hand [M18.12]   DATE OF ONSET: several years ago   REFERRING PHYSICIAN: Jsoiah Jimenez MD  RETURN PHYSICIAN APPOINTMENT: 4 weeks     INITIAL ASSESSMENT:  Ms. Brent Rico presents with decreased functional use, strength and range of motion of her left hand and upper extremity that is affecting her independence with activities of daily living and ability to perform job tasks. I feel that Ms. Brent Rico will benefit from skilled occupational therapy to maximize the functional use of her hand and upper extremity in daily activities and work tasks. PLAN OF CARE:   PROBLEM LIST:  1. Pain in left hand. 2. Decreased motion in left hand. 3. Decreased strength in left hand. INTERVENTIONS PLANNED:  1. Modalities that may include fluidotherapy, paraffin, ultrasound, and light therapy. 2. Therapeutic exercise including a home exercise program.  3. Manual therapy. 4. Therapeutic activities. TREATMENT PLAN:  Effective Dates: 2017 TO 2017. Frequency/Duration: 2 times a week for 10 weeks  GOALS: (Goals have been discussed and agreed upon with patient.)  Short-Term Functional Goals: Time Frame: 4 weeks  1. Decrease pain to 5 to allow patient to perform self care tasks. 2. Increase motion in left hand by 20 degrees to improve functional use of upper extremity in ADL activities.   3. Increase strength in left hand by 10 pounds to allow patient to  and lift objects during self care activities. Discharge Goals: Time Frame: 12 weeks  1. Decrease pain to 2 to allow patient to perform all household  tasks. 2. Increase motion in left hand by 30 degreees to allow patient to perform all ADL activities. 3. Increase strength in left hand by 20 pounds to allow patient to , lift, hold, and carry heavy objects. Rehabilitation Potential For Stated Goals: Good  Regarding Morris Rocha's therapy, I certify that the treatment plan above will be carried out by a therapist or under their direction. Thank you for this referral,  Cassius Hyde OT       Referring Physician Signature: Franky Mcclellan MD _________________________  Date _________            The information in this section was collected on 2/21/2017 (except where otherwise noted). OCCUPATIONAL PROFILE & HISTORY:   History of Present Injury/Illness (Reason for Referral): The patient has had increasing pain in her left thumb for several years. Past Medical History/Comorbidities:   Ms. Hunter Salinas  has a past medical history of Anemia (2015); Anxiety (5/26/2015); Chronic insomnia (5/26/2015); Chronic pain; Depression (5/26/2015); Genital herpes, unspecified (5/26/2015); Hypertriglyceridemia (5/26/2015); Hypothyroidism (5/26/2015); Osteoarthritis (5/26/2015); Sciatica (5/26/2015); and Sleep related leg cramps (5/26/2015). Ms. Hunter Salinas  has a past surgical history that includes dilation and curettage (2006). Social History/Living Environment:      Prior Level of Function/Work/Activity:  Independent    Current Medications:    Current Outpatient Prescriptions:     calcium-cholecalciferol, d3, (CALCIUM 600 + D) 600-125 mg-unit tab, Take  by mouth daily. Hold all vitamins for 7 days prior to surgery per anesthesia protocol, Disp: , Rfl:     levothyroxine (SYNTHROID) 88 mcg tablet, Take 1 Tab by mouth Daily (before breakfast).  Indications: hypothyroidism, Disp: 90 Tab, Rfl: 4   sertraline (ZOLOFT) 100 mg tablet, Take 1 Tab by mouth daily. Indications: ANXIETY WITH DEPRESSION (Patient taking differently: Take 100 mg by mouth every evening. Indications: ANXIETY WITH DEPRESSION), Disp: 90 Tab, Rfl: 3    alendronate (FOSAMAX) 70 mg tablet, Take 1 Tab by mouth every seven (7) days. , Disp: 12 Tab, Rfl: 3    ALPRAZolam (XANAX) 0.5 mg tablet, Take 1 Tab by mouth nightly as needed for Anxiety. Max Daily Amount: 0.5 mg. (Patient taking differently: Take 0.5 mg by mouth daily as needed for Anxiety.), Disp: 90 Tab, Rfl: 1    acyclovir (ZOVIRAX) 400 mg tablet, Take 1 Tab by mouth two (2) times a day., Disp: 180 Tab, Rfl: 3    cholecalciferol (VITAMIN D3) 1,000 unit cap, Take 1,000 Units by mouth two (2) times a day. Hold all vitamins for 7 days prior to surgery per anesthesia protocol, Disp: , Rfl:     nabumetone (RELAFEN) 500 mg tablet, Take 1 Tab by mouth two (2) times a day. Take with food for arthritis pain  Indications: OSTEOARTHRITIS (Patient taking differently: Take 500 mg by mouth as needed. Hold all vitamins for 7 days prior to surgery per anesthesia protocol  Indications: OSTEOARTHRITIS), Disp: 180 Tab, Rfl: 4    ferrous sulfate (IRON) 325 mg (65 mg iron) tablet, Take 325 mg by mouth Daily (before breakfast). , Disp: , Rfl:    Date Last Reviewed:  2/23/2017   Number of medical conditions (excluding presenting problem) that affect the Plan of Care: Brief history (0):  LOW COMPLEXITY   ASSESSMENT OF OCCUPATIONAL PERFORMANCE:   RANGE OF MOTION:     · AROM:       Measurements not taken due to recent surgery. STRENGTH: Not tested yet. SENSATION:             Physical Skills Involved:  1. Range of Motion  2. Strength  3. Fine or Gross Motor Coordination  4. Sensation  5. Dexterity Cognitive Skills Affected (resulting in the inability to perform in a timely and safe manner):   1. none Psychosocial Skills Affected:  1. none   Number of elements that affect the Plan of Care: 1-3:  LOW COMPLEXITY   CLINICAL DECISION MAKING:   Outcome Measure: Tool Used: Disabilities of the Arm, Shoulder and Hand (DASH) Questionnaire - Quick Version  Score:  Initial: 43/55  Most Recent: X/55 (Date: -- )   Interpretation of Score: The DASH is designed to measure the activities of daily living in person's with upper extremity dysfunction or pain. Each section is scored on a 1-5 scale, 5 representing the greatest disability. The scores of each section are added together for a total score of 55. Score 11 12-19 20-28 29-37 38-45 46-54 55   Modifier CH CI CJ CK CL CM CN     ? Carrying, Moving, and Handling Objects:     - CURRENT STATUS: CL - 60%-79% impaired, limited or restricted    - GOAL STATUS: CJ - 20%-39% impaired, limited or restricted    - D/C STATUS:  ---------------To be determined---------------    Medical Necessity:   · Patient is expected to demonstrate progress in strength and range of motion to decrease assistance required with ADL and household activities. .  Reason for Services/Other Comments:  · The patient has limited motion ,strength and function in her left hand. .  Clinical Decision-Making Assessment:     Clinical Decision-Making: LOW COMPLEXITY   TREATMENT:   (In addition to Assessment/Re-Assessment sessions the following treatments were rendered)  Pre-treatment Symptoms/Complaints:    Pain: Initial:   Pain Intensity 1: 4  Pain Location 1: Hand  Pain Orientation 1: Left  Post Session:  3     The patient was instructed in a home exercise program  The patient was provided with a removable CMC splint.   Patient stated \"My thumb is moving a little better\"    Manual Therapy: (Soft Tissue Mobilization Duration  Duration: 15 Minutes  Duration: 15 Minutes): Technique: Retrograde massage  Tissue Mobilized: Scar/adhesion  LUE Soft Tissue Mobilization: Yes  Technique: Retrograde massage  Tissue Mobilized: Scar/adhesion   Therapeutic Exercise: : The patient's home exercise program was reviewed. Date:  2/23/17 Date: Date: Date: Date:   Activity/Exercise Parameters Parameters Parameters Parameters Parameters   AROM during Fluidotherapy 20 min       Paraffin with Stretch 15 min         Retrograde massage, Friction Scar massage, Joint Mobilization   15 min       Scarf Curl 5 min         Washer Game 5 min       Individual Gripper          Hand Meridian          Cones          Pegs          Clothes Pins          A-R Bar          Exerstick          Velcro-Roll          AROM EX 10 min       RESISTIVE EXERCISES Weight/ Sets/Reps   Weight/ Sets/Reps Weight/ Sets/Reps Weight/ Sets/Reps Weight/ Sets/Reps   WEIGHT WELL        Sup/Pro        UD/RD        Wrist Flex/Ext        Free Weights          UBE(Minutes)          Nautilus        Compound Row        Vertical Chest        Overhead Press                    HEP: As above; handouts given to patient for all exercises. Therapeutic Modalities:         Left Wrist Heat  Type: Paraffin bath  Duration: 15 minutes  Patient Position: Sitting                                     Joint Mobilization:        Treatment Times:  · Therapeutic Exercise: 30 minutes  · Manual Therapy: 15 minutes  · Parafin: 15 minutes  · Whirlpool:  minutes  · Other:  minutes     Treatment/Session Assessment:    · Response to Treatment:  Patients tolerated treatment well with no complications. Upon completion of treatment, skin condition was normal..  · Compliance with Program/Exercises: Will assess as treatment progresses. · Recommendations/Intent for next treatment session: \"Next visit will focus on advancements to more challenging activities\". Adjustments will be made on her splint if neeeded to improve fit or comfort.   Total Treatment Duration:  OT Patient Time In/Time Out  Time In: 0200  Time Out: [address], OT

## 2017-02-28 ENCOUNTER — HOSPITAL ENCOUNTER (OUTPATIENT)
Dept: PHYSICAL THERAPY | Age: 67
Discharge: HOME | End: 2017-02-28
Payer: MEDICARE

## 2017-02-28 PROCEDURE — 97110 THERAPEUTIC EXERCISES: CPT

## 2017-02-28 PROCEDURE — 97018 PARAFFIN BATH THERAPY: CPT

## 2017-02-28 PROCEDURE — 97140 MANUAL THERAPY 1/> REGIONS: CPT

## 2017-02-28 NOTE — PROGRESS NOTES
Lauren Antonio  : 1950 [facility] [place] [address], [address] [facility]  [phone]   [phone]         OUTPATIENT OCCUPATIONAL THERAPY: Daily Note 2017    ICD-10: Treatment Diagnosis: Pain in left hand (M79.642)Stiffness of left hand, not elsewhere classified (M25.642)  Precautions/Allergies:   Pcn [penicillins] and Sulfa (sulfonamide antibiotics)   Fall Risk Score: 0 (? 5 = High Risk)  MD Orders: Evaluate and treat: fabricate CMC splint MEDICAL/REFERRING DIAGNOSIS:   Unilateral primary osteoarthritis of first carpometacarpal joint, left hand [M18.12]   DATE OF ONSET: several years ago   REFERRING PHYSICIAN: Desirae Limon MD  RETURN PHYSICIAN APPOINTMENT: 4 weeks     INITIAL ASSESSMENT:  Ms. Liana Cheng presents with decreased functional use, strength and range of motion of her left hand and upper extremity that is affecting her independence with activities of daily living and ability to perform job tasks. I feel that Ms. Liana Cheng will benefit from skilled occupational therapy to maximize the functional use of her hand and upper extremity in daily activities and work tasks. PLAN OF CARE:   PROBLEM LIST:  1. Pain in left hand. 2. Decreased motion in left hand. 3. Decreased strength in left hand. INTERVENTIONS PLANNED:  1. Modalities that may include fluidotherapy, paraffin, ultrasound, and light therapy. 2. Therapeutic exercise including a home exercise program.  3. Manual therapy. 4. Therapeutic activities. TREATMENT PLAN:  Effective Dates: 2017 TO 2017. Frequency/Duration: 2 times a week for 10 weeks  GOALS: (Goals have been discussed and agreed upon with patient.)  Short-Term Functional Goals: Time Frame: 4 weeks  1. Decrease pain to 5 to allow patient to perform self care tasks. 2. Increase motion in left hand by 20 degrees to improve functional use of upper extremity in ADL activities.   3. Increase strength in left hand by 10 pounds to allow patient to  and lift objects during self care activities. Discharge Goals: Time Frame: 12 weeks  1. Decrease pain to 2 to allow patient to perform all household  tasks. 2. Increase motion in left hand by 30 degreees to allow patient to perform all ADL activities. 3. Increase strength in left hand by 20 pounds to allow patient to , lift, hold, and carry heavy objects. Rehabilitation Potential For Stated Goals: Good  Regarding Rubén Mastersonnadir's therapy, I certify that the treatment plan above will be carried out by a therapist or under their direction. Thank you for this referral,  Radha Beckford OT       Referring Physician Signature: Jennifer Stevenson MD _________________________  Date _________            The information in this section was collected on 2/21/2017 (except where otherwise noted). OCCUPATIONAL PROFILE & HISTORY:   History of Present Injury/Illness (Reason for Referral): The patient has had increasing pain in her left thumb for several years. Past Medical History/Comorbidities:   Ms. Lorne Putnam  has a past medical history of Anemia (2015); Anxiety (5/26/2015); Chronic insomnia (5/26/2015); Chronic pain; Depression (5/26/2015); Genital herpes, unspecified (5/26/2015); Hypertriglyceridemia (5/26/2015); Hypothyroidism (5/26/2015); Osteoarthritis (5/26/2015); Sciatica (5/26/2015); Sinus problem; and Sleep related leg cramps (5/26/2015). Ms. Lorne Putnam  has a past surgical history that includes dilation and curettage (2006) and arthroplasty. Social History/Living Environment:      Prior Level of Function/Work/Activity:  Independent    Current Medications:    Current Outpatient Prescriptions:     calcium-cholecalciferol, d3, (CALCIUM 600 + D) 600-125 mg-unit tab, Take  by mouth daily. Hold all vitamins for 7 days prior to surgery per anesthesia protocol, Disp: , Rfl:     levothyroxine (SYNTHROID) 88 mcg tablet, Take 1 Tab by mouth Daily (before breakfast).  Indications: hypothyroidism, Disp: 90 Tab, Rfl: 4    sertraline (ZOLOFT) 100 mg tablet, Take 1 Tab by mouth daily. Indications: ANXIETY WITH DEPRESSION (Patient taking differently: Take 100 mg by mouth every evening. Indications: ANXIETY WITH DEPRESSION), Disp: 90 Tab, Rfl: 3    alendronate (FOSAMAX) 70 mg tablet, Take 1 Tab by mouth every seven (7) days. , Disp: 12 Tab, Rfl: 3    ALPRAZolam (XANAX) 0.5 mg tablet, Take 1 Tab by mouth nightly as needed for Anxiety. Max Daily Amount: 0.5 mg. (Patient taking differently: Take 0.5 mg by mouth daily as needed for Anxiety.), Disp: 90 Tab, Rfl: 1    acyclovir (ZOVIRAX) 400 mg tablet, Take 1 Tab by mouth two (2) times a day., Disp: 180 Tab, Rfl: 3    cholecalciferol (VITAMIN D3) 1,000 unit cap, Take 1,000 Units by mouth two (2) times a day. Hold all vitamins for 7 days prior to surgery per anesthesia protocol, Disp: , Rfl:     nabumetone (RELAFEN) 500 mg tablet, Take 1 Tab by mouth two (2) times a day. Take with food for arthritis pain  Indications: OSTEOARTHRITIS (Patient taking differently: Take 500 mg by mouth as needed. Hold all vitamins for 7 days prior to surgery per anesthesia protocol  Indications: OSTEOARTHRITIS), Disp: 180 Tab, Rfl: 4    ferrous sulfate (IRON) 325 mg (65 mg iron) tablet, Take 325 mg by mouth Daily (before breakfast). , Disp: , Rfl:    Date Last Reviewed: 2/28/2017    Number of medical conditions (excluding presenting problem) that affect the Plan of Care: Brief history (0):  LOW COMPLEXITY   ASSESSMENT OF OCCUPATIONAL PERFORMANCE:   RANGE OF MOTION:     · AROM:       Measurements not taken due to recent surgery. STRENGTH: Not tested yet. SENSATION:             Physical Skills Involved:  1. Range of Motion  2. Strength  3. Fine or Gross Motor Coordination  4. Sensation  5. Dexterity Cognitive Skills Affected (resulting in the inability to perform in a timely and safe manner):   1. none Psychosocial Skills Affected:  1. none   Number of elements that affect the Plan of Care: 1-3:  LOW COMPLEXITY   CLINICAL DECISION MAKING:   Outcome Measure: Tool Used: Disabilities of the Arm, Shoulder and Hand (DASH) Questionnaire - Quick Version  Score:  Initial: 43/55  Most Recent: X/55 (Date: -- )   Interpretation of Score: The DASH is designed to measure the activities of daily living in person's with upper extremity dysfunction or pain. Each section is scored on a 1-5 scale, 5 representing the greatest disability. The scores of each section are added together for a total score of 55. Score 11 12-19 20-28 29-37 38-45 46-54 55   Modifier CH CI CJ CK CL CM CN     ? Carrying, Moving, and Handling Objects:     - CURRENT STATUS: CL - 60%-79% impaired, limited or restricted    - GOAL STATUS: CJ - 20%-39% impaired, limited or restricted    - D/C STATUS:  ---------------To be determined---------------    Medical Necessity:   · Patient is expected to demonstrate progress in strength and range of motion to decrease assistance required with ADL and household activities. .  Reason for Services/Other Comments:  · The patient has limited motion ,strength and function in her left hand. .  Clinical Decision-Making Assessment:     Clinical Decision-Making: LOW COMPLEXITY   TREATMENT:   (In addition to Assessment/Re-Assessment sessions the following treatments were rendered)  Pre-treatment Symptoms/Complaints:    Pain: Initial:   Pain Intensity 1: 2  Pain Location 1: Hand  Pain Orientation 1: Left  Post Session:  3     The patient was instructed in a home exercise program  The patient was provided with a removable CMC splint. Patient stated \"I am not having much pain. \"    Manual Therapy: (Soft Tissue Mobilization Duration  Duration: 15 Minutes  Duration: 15 Minutes): Technique: Retrograde massage  Tissue Mobilized: Scar/adhesion  LUE Soft Tissue Mobilization: Yes  Technique: Retrograde massage  Tissue Mobilized: Scar/adhesion   Therapeutic Exercise: : The patient's home exercise program was reviewed. Date:  2/23/17 Date:  2/28/17 Date: Date: Date:   Activity/Exercise Parameters Parameters Parameters Parameters Parameters   AROM during Fluidotherapy 20 min 20 min      Paraffin with Stretch 15 min   15 min      Retrograde massage, Friction Scar massage, Joint Mobilization   15 min 15 min      Scarf Curl 5 min   5 min      Washer Game 5 min 5 min      Individual Gripper          Hand [place]          Cones          Pegs          Clothes Pins          A-R Bar          Exerstick          Velcro-Roll          AROM EX 10 min 10 min      RESISTIVE EXERCISES Weight/ Sets/Reps   Weight/ Sets/Reps Weight/ Sets/Reps Weight/ Sets/Reps Weight/ Sets/Reps   WEIGHT WELL        Sup/Pro        UD/RD        Wrist Flex/Ext        Free Weights          UBE(Minutes)          Nautilus        Compound Row        Vertical Chest        Overhead Press                    HEP: As above; handouts given to patient for all exercises. Therapeutic Modalities:         Left Wrist Heat  Type: Paraffin bath (with a thumb flexion stretch)  Duration: 15 minutes  Patient Position: Sitting                                     Joint Mobilization:        Treatment Times:  · Therapeutic Exercise: 30 minutes  · Manual Therapy: 15 minutes  · Parafin: 15 minutes  · Whirlpool:  minutes  · Other:  minutes     Treatment/Session Assessment:    · Response to Treatment:  Patients tolerated treatment well with no complications. Upon completion of treatment, skin condition was normal..  · Compliance with Program/Exercises: Will assess as treatment progresses. · Recommendations/Intent for next treatment session: \"Next visit will focus on advancements to more challenging activities\". Adjustments will be made on her splint if neeeded to improve fit or comfort.   Total Treatment Duration:  OT Patient Time In/Time Out  Time In: 0200  Time Out: [address], OT

## 2017-03-02 ENCOUNTER — HOSPITAL ENCOUNTER (OUTPATIENT)
Dept: PHYSICAL THERAPY | Age: 67
Discharge: HOME | End: 2017-03-02
Payer: MEDICARE

## 2017-03-02 PROCEDURE — 97018 PARAFFIN BATH THERAPY: CPT

## 2017-03-02 PROCEDURE — 97140 MANUAL THERAPY 1/> REGIONS: CPT

## 2017-03-02 PROCEDURE — 97110 THERAPEUTIC EXERCISES: CPT

## 2017-03-02 NOTE — PROGRESS NOTES
Porsha Sanchez  : 1950 [facility] [place] [address], [place] [address].  [phone]   [phone]         OUTPATIENT OCCUPATIONAL THERAPY: Daily Note 3/2/2017    ICD-10: Treatment Diagnosis: Pain in left hand (M79.642)Stiffness of left hand, not elsewhere classified (M25.642)  Precautions/Allergies:   Pcn [penicillins] and Sulfa (sulfonamide antibiotics)   Fall Risk Score: 0 (? 5 = High Risk)  MD Orders: Evaluate and treat: fabricate CMC splint MEDICAL/REFERRING DIAGNOSIS:   Unilateral primary osteoarthritis of first carpometacarpal joint, left hand [M18.12]   DATE OF ONSET: several years ago   REFERRING PHYSICIAN: Zoraida Logan MD  RETURN PHYSICIAN APPOINTMENT: 4 weeks     INITIAL ASSESSMENT:  Ms. Loco Mansfield presents with decreased functional use, strength and range of motion of her left hand and upper extremity that is affecting her independence with activities of daily living and ability to perform job tasks. I feel that Ms. Loco Mansfield will benefit from skilled occupational therapy to maximize the functional use of her hand and upper extremity in daily activities and work tasks. PLAN OF CARE:   PROBLEM LIST:  1. Pain in left hand. 2. Decreased motion in left hand. 3. Decreased strength in left hand. INTERVENTIONS PLANNED:  1. Modalities that may include fluidotherapy, paraffin, ultrasound, and light therapy. 2. Therapeutic exercise including a home exercise program.  3. Manual therapy. 4. Therapeutic activities. TREATMENT PLAN:  Effective Dates: 2017 TO 2017. Frequency/Duration: 2 times a week for 10 weeks  GOALS: (Goals have been discussed and agreed upon with patient.)  Short-Term Functional Goals: Time Frame: 4 weeks  1. Decrease pain to 5 to allow patient to perform self care tasks. 2. Increase motion in left hand by 20 degrees to improve functional use of upper extremity in ADL activities.   3. Increase strength in left hand by 10 pounds to allow patient to  and lift objects during self care activities. Discharge Goals: Time Frame: 12 weeks  1. Decrease pain to 2 to allow patient to perform all household  tasks. 2. Increase motion in left hand by 30 degreees to allow patient to perform all ADL activities. 3. Increase strength in left hand by 20 pounds to allow patient to , lift, hold, and carry heavy objects. Rehabilitation Potential For Stated Goals: Good  Regarding Michael Rocha's therapy, I certify that the treatment plan above will be carried out by a therapist or under their direction. Thank you for this referral,  Lindsay Valerio, OT       Referring Physician Signature: Esau Enriquez MD _________________________  Date _________            The information in this section was collected on 2/21/2017 (except where otherwise noted). OCCUPATIONAL PROFILE & HISTORY:   History of Present Injury/Illness (Reason for Referral): The patient has had increasing pain in her left thumb for several years. Past Medical History/Comorbidities:   Ms. Fiona Askew  has a past medical history of Anemia (2015); Anxiety (5/26/2015); Chronic insomnia (5/26/2015); Chronic pain; Depression (5/26/2015); Genital herpes, unspecified (5/26/2015); Hypertriglyceridemia (5/26/2015); Hypothyroidism (5/26/2015); Osteoarthritis (5/26/2015); Sciatica (5/26/2015); Sinus problem; and Sleep related leg cramps (5/26/2015). Ms. Fiona Askew  has a past surgical history that includes dilation and curettage (2006) and arthroplasty. Social History/Living Environment:      Prior Level of Function/Work/Activity:  Independent    Current Medications:    Current Outpatient Prescriptions:     calcium-cholecalciferol, d3, (CALCIUM 600 + D) 600-125 mg-unit tab, Take  by mouth daily. Hold all vitamins for 7 days prior to surgery per anesthesia protocol, Disp: , Rfl:     levothyroxine (SYNTHROID) 88 mcg tablet, Take 1 Tab by mouth Daily (before breakfast).  Indications: hypothyroidism, Disp: 90 Tab, Rfl: 4    sertraline (ZOLOFT) 100 mg tablet, Take 1 Tab by mouth daily. Indications: ANXIETY WITH DEPRESSION (Patient taking differently: Take 100 mg by mouth every evening. Indications: ANXIETY WITH DEPRESSION), Disp: 90 Tab, Rfl: 3    alendronate (FOSAMAX) 70 mg tablet, Take 1 Tab by mouth every seven (7) days. , Disp: 12 Tab, Rfl: 3    ALPRAZolam (XANAX) 0.5 mg tablet, Take 1 Tab by mouth nightly as needed for Anxiety. Max Daily Amount: 0.5 mg. (Patient taking differently: Take 0.5 mg by mouth daily as needed for Anxiety.), Disp: 90 Tab, Rfl: 1    acyclovir (ZOVIRAX) 400 mg tablet, Take 1 Tab by mouth two (2) times a day., Disp: 180 Tab, Rfl: 3    cholecalciferol (VITAMIN D3) 1,000 unit cap, Take 1,000 Units by mouth two (2) times a day. Hold all vitamins for 7 days prior to surgery per anesthesia protocol, Disp: , Rfl:     nabumetone (RELAFEN) 500 mg tablet, Take 1 Tab by mouth two (2) times a day. Take with food for arthritis pain  Indications: OSTEOARTHRITIS (Patient taking differently: Take 500 mg by mouth as needed. Hold all vitamins for 7 days prior to surgery per anesthesia protocol  Indications: OSTEOARTHRITIS), Disp: 180 Tab, Rfl: 4    ferrous sulfate (IRON) 325 mg (65 mg iron) tablet, Take 325 mg by mouth Daily (before breakfast). , Disp: , Rfl:    Date Last Reviewed: 3/2/2017    Number of medical conditions (excluding presenting problem) that affect the Plan of Care: Brief history (0):  LOW COMPLEXITY   ASSESSMENT OF OCCUPATIONAL PERFORMANCE:   RANGE OF MOTION:     · AROM:       Measurements not taken due to recent surgery. STRENGTH: Not tested yet. SENSATION:             Physical Skills Involved:  1. Range of Motion  2. Strength  3. Fine or Gross Motor Coordination  4. Sensation  5. Dexterity Cognitive Skills Affected (resulting in the inability to perform in a timely and safe manner):   1. none Psychosocial Skills Affected:  1. none   Number of elements that affect the Plan of Care: 1-3:  LOW COMPLEXITY   CLINICAL DECISION MAKING:   Outcome Measure: Tool Used: Disabilities of the Arm, Shoulder and Hand (DASH) Questionnaire - Quick Version  Score:  Initial: 43/55  Most Recent: X/55 (Date: -- )   Interpretation of Score: The DASH is designed to measure the activities of daily living in person's with upper extremity dysfunction or pain. Each section is scored on a 1-5 scale, 5 representing the greatest disability. The scores of each section are added together for a total score of 55. Score 11 12-19 20-28 29-37 38-45 46-54 55   Modifier CH CI CJ CK CL CM CN     ? Carrying, Moving, and Handling Objects:     - CURRENT STATUS: CL - 60%-79% impaired, limited or restricted    - GOAL STATUS: CJ - 20%-39% impaired, limited or restricted    - D/C STATUS:  ---------------To be determined---------------    Medical Necessity:   · Patient is expected to demonstrate progress in strength and range of motion to decrease assistance required with ADL and household activities. .  Reason for Services/Other Comments:  · The patient has limited motion ,strength and function in her left hand. .  Clinical Decision-Making Assessment:     Clinical Decision-Making: LOW COMPLEXITY   TREATMENT:   (In addition to Assessment/Re-Assessment sessions the following treatments were rendered)  Pre-treatment Symptoms/Complaints:    Pain: Initial:   Pain Intensity 1: 2  Pain Location 1: Hand  Pain Orientation 1: Left  Post Session:  3     The patient was instructed in a home exercise program  The patient was provided with a removable CMC splint. Patient stated \"I am doing well. \"    Manual Therapy: (Soft Tissue Mobilization Duration  Duration: 15 Minutes  Duration: 15 Minutes): Technique: Retrograde massage  Tissue Mobilized: Scar/adhesion  LUE Soft Tissue Mobilization: Yes  Technique: Retrograde massage  Tissue Mobilized: Scar/adhesion   Therapeutic Exercise: : The patient's home exercise program was reviewed. Date:  2/23/17 Date:  2/28/17 Date:  3/2/17 Date: Date:   Activity/Exercise Parameters Parameters Parameters Parameters Parameters   AROM during Fluidotherapy 20 min 20 min 20 min     Paraffin with Stretch 15 min   15 min 15 min     Retrograde massage, Friction Scar massage, Joint Mobilization   15 min 15 min 15 min     Scarf Curl 5 min   5 min 5 min     Washer Game 5 min 5 min 5 min     Individual Gripper          Hand [place]          Cones          Pegs          Clothes Pins          A-R Bar          Exerstick          Velcro-Roll          AROM EX 10 min 10 min 10 min     RESISTIVE EXERCISES Weight/ Sets/Reps   Weight/ Sets/Reps Weight/ Sets/Reps Weight/ Sets/Reps Weight/ Sets/Reps   WEIGHT WELL        Sup/Pro        UD/RD        Wrist Flex/Ext        Free Weights          UBE(Minutes)          Nautilus        Compound Row        Vertical Chest        Overhead Press                    HEP: As above; handouts given to patient for all exercises. Therapeutic Modalities:         Left Wrist Heat  Type: Paraffin bath (with a thumb flexion stretch)  Duration: 15 minutes  Patient Position: Sitting                                     Joint Mobilization:        Treatment Times:  · Therapeutic Exercise: 30 minutes  · Manual Therapy: 15 minutes  · Parafin: 15 minutes  · Whirlpool:  minutes  · Other:  minutes     Treatment/Session Assessment:    · Response to Treatment:  Patients tolerated treatment well with no complications. Upon completion of treatment, skin condition was normal..  · Compliance with Program/Exercises: Will assess as treatment progresses. · Recommendations/Intent for next treatment session: \"Next visit will focus on advancements to more challenging activities\". Will continue per MD.  Total Treatment Duration:  OT Patient Time In/Time Out  Time In: 0200  Time Out: [address], OT

## 2017-03-06 ENCOUNTER — HOSPITAL ENCOUNTER (OUTPATIENT)
Dept: PHYSICAL THERAPY | Age: 67
Discharge: HOME | End: 2017-03-06
Payer: MEDICARE

## 2017-03-06 PROCEDURE — 97018 PARAFFIN BATH THERAPY: CPT

## 2017-03-06 PROCEDURE — 97140 MANUAL THERAPY 1/> REGIONS: CPT

## 2017-03-06 PROCEDURE — 97110 THERAPEUTIC EXERCISES: CPT

## 2017-03-06 NOTE — PROGRESS NOTES
Abbi Cameron  : 1950 [facility] [place] [address], [place] [address].  [phone]   [phone]         OUTPATIENT OCCUPATIONAL THERAPY: Daily Note 3/6/2017    ICD-10: Treatment Diagnosis: Pain in left hand (M79.642)Stiffness of left hand, not elsewhere classified (M25.642)  Precautions/Allergies:   Pcn [penicillins] and Sulfa (sulfonamide antibiotics)   Fall Risk Score: 0 (? 5 = High Risk)  MD Orders: Evaluate and treat: fabricate CMC splint MEDICAL/REFERRING DIAGNOSIS:   Unilateral primary osteoarthritis of first carpometacarpal joint, left hand [M18.12]   DATE OF ONSET: several years ago   REFERRING PHYSICIAN: Lien Estrada MD  RETURN PHYSICIAN APPOINTMENT: 4 weeks     INITIAL ASSESSMENT:  Ms. Maritza Forrester presents with decreased functional use, strength and range of motion of her left hand and upper extremity that is affecting her independence with activities of daily living and ability to perform job tasks. I feel that Ms. Maritza Forrester will benefit from skilled occupational therapy to maximize the functional use of her hand and upper extremity in daily activities and work tasks. PLAN OF CARE:   PROBLEM LIST:  1. Pain in left hand. 2. Decreased motion in left hand. 3. Decreased strength in left hand. INTERVENTIONS PLANNED:  1. Modalities that may include fluidotherapy, paraffin, ultrasound, and light therapy. 2. Therapeutic exercise including a home exercise program.  3. Manual therapy. 4. Therapeutic activities. TREATMENT PLAN:  Effective Dates: 2017 TO 2017. Frequency/Duration: 2 times a week for 10 weeks  GOALS: (Goals have been discussed and agreed upon with patient.)  Short-Term Functional Goals: Time Frame: 4 weeks  1. Decrease pain to 5 to allow patient to perform self care tasks. 2. Increase motion in left hand by 20 degrees to improve functional use of upper extremity in ADL activities.   3. Increase strength in left hand by 10 pounds to allow patient to  and lift objects during self care activities. Discharge Goals: Time Frame: 12 weeks  1. Decrease pain to 2 to allow patient to perform all household  tasks. 2. Increase motion in left hand by 30 degreees to allow patient to perform all ADL activities. 3. Increase strength in left hand by 20 pounds to allow patient to , lift, hold, and carry heavy objects. Rehabilitation Potential For Stated Goals: Good  Regarding Amanda Mastersonnadir's therapy, I certify that the treatment plan above will be carried out by a therapist or under their direction. Thank you for this referral,  Michelle Law OT       Referring Physician Signature: Josiah Jimenez MD _________________________  Date _________            The information in this section was collected on 2/21/2017 (except where otherwise noted). OCCUPATIONAL PROFILE & HISTORY:   History of Present Injury/Illness (Reason for Referral): The patient has had increasing pain in her left thumb for several years. Past Medical History/Comorbidities:   Ms. Brent Rico  has a past medical history of Anemia (2015); Anxiety (5/26/2015); Chronic insomnia (5/26/2015); Chronic pain; Depression (5/26/2015); Genital herpes, unspecified (5/26/2015); Hypertriglyceridemia (5/26/2015); Hypothyroidism (5/26/2015); Osteoarthritis (5/26/2015); Sciatica (5/26/2015); Sinus problem; and Sleep related leg cramps (5/26/2015). Ms. Brent Rico  has a past surgical history that includes dilation and curettage (2006) and arthroplasty. Social History/Living Environment:      Prior Level of Function/Work/Activity:  Independent    Current Medications:    Current Outpatient Prescriptions:     calcium-cholecalciferol, d3, (CALCIUM 600 + D) 600-125 mg-unit tab, Take  by mouth daily. Hold all vitamins for 7 days prior to surgery per anesthesia protocol, Disp: , Rfl:     levothyroxine (SYNTHROID) 88 mcg tablet, Take 1 Tab by mouth Daily (before breakfast).  Indications: hypothyroidism, Disp: 90 Tab, Rfl: 4    sertraline (ZOLOFT) 100 mg tablet, Take 1 Tab by mouth daily. Indications: ANXIETY WITH DEPRESSION (Patient taking differently: Take 100 mg by mouth every evening. Indications: ANXIETY WITH DEPRESSION), Disp: 90 Tab, Rfl: 3    alendronate (FOSAMAX) 70 mg tablet, Take 1 Tab by mouth every seven (7) days. , Disp: 12 Tab, Rfl: 3    ALPRAZolam (XANAX) 0.5 mg tablet, Take 1 Tab by mouth nightly as needed for Anxiety. Max Daily Amount: 0.5 mg. (Patient taking differently: Take 0.5 mg by mouth daily as needed for Anxiety.), Disp: 90 Tab, Rfl: 1    acyclovir (ZOVIRAX) 400 mg tablet, Take 1 Tab by mouth two (2) times a day., Disp: 180 Tab, Rfl: 3    cholecalciferol (VITAMIN D3) 1,000 unit cap, Take 1,000 Units by mouth two (2) times a day. Hold all vitamins for 7 days prior to surgery per anesthesia protocol, Disp: , Rfl:     nabumetone (RELAFEN) 500 mg tablet, Take 1 Tab by mouth two (2) times a day. Take with food for arthritis pain  Indications: OSTEOARTHRITIS (Patient taking differently: Take 500 mg by mouth as needed. Hold all vitamins for 7 days prior to surgery per anesthesia protocol  Indications: OSTEOARTHRITIS), Disp: 180 Tab, Rfl: 4    ferrous sulfate (IRON) 325 mg (65 mg iron) tablet, Take 325 mg by mouth Daily (before breakfast). , Disp: , Rfl:    Date Last Reviewed: 3/6/2017    Number of medical conditions (excluding presenting problem) that affect the Plan of Care: Brief history (0):  LOW COMPLEXITY   ASSESSMENT OF OCCUPATIONAL PERFORMANCE:   RANGE OF MOTION:     · AROM:       Measurements not taken due to recent surgery. STRENGTH: Not tested yet. SENSATION:             Physical Skills Involved:  1. Range of Motion  2. Strength  3. Fine or Gross Motor Coordination  4. Sensation  5. Dexterity Cognitive Skills Affected (resulting in the inability to perform in a timely and safe manner):   1. none Psychosocial Skills Affected:  1. none   Number of elements that affect the Plan of Care: 1-3:  LOW COMPLEXITY   CLINICAL DECISION MAKING:   Outcome Measure: Tool Used: Disabilities of the Arm, Shoulder and Hand (DASH) Questionnaire - Quick Version  Score:  Initial: 43/55  Most Recent: X/55 (Date: -- )   Interpretation of Score: The DASH is designed to measure the activities of daily living in person's with upper extremity dysfunction or pain. Each section is scored on a 1-5 scale, 5 representing the greatest disability. The scores of each section are added together for a total score of 55. Score 11 12-19 20-28 29-37 38-45 46-54 55   Modifier CH CI CJ CK CL CM CN     ? Carrying, Moving, and Handling Objects:     - CURRENT STATUS: CL - 60%-79% impaired, limited or restricted    - GOAL STATUS: CJ - 20%-39% impaired, limited or restricted    - D/C STATUS:  ---------------To be determined---------------    Medical Necessity:   · Patient is expected to demonstrate progress in strength and range of motion to decrease assistance required with ADL and household activities. .  Reason for Services/Other Comments:  · The patient has limited motion ,strength and function in her left hand. .  Clinical Decision-Making Assessment:     Clinical Decision-Making: LOW COMPLEXITY   TREATMENT:   (In addition to Assessment/Re-Assessment sessions the following treatments were rendered)  Pre-treatment Symptoms/Complaints:    Pain: Initial:   Pain Intensity 1: 0  Pain Location 1: Hand  Pain Orientation 1: Left  Post Session:  3     The patient was instructed in a home exercise program  The patient was provided with a removable CMC splint. Patient stated \"I am not having any pain. \"    Manual Therapy: (Soft Tissue Mobilization Duration  Duration: 15 Minutes  Duration: 15 Minutes): Technique: Retrograde massage  Tissue Mobilized: Scar/adhesion  LUE Soft Tissue Mobilization: Yes  Technique: Retrograde massage  Tissue Mobilized: Scar/adhesion   Therapeutic Exercise: : The patient's home exercise program was reviewed. Date:  2/23/17 Date:  2/28/17 Date:  3/2/17 Date:  3/6/17 Date:   Activity/Exercise Parameters Parameters Parameters Parameters Parameters   AROM during Fluidotherapy 20 min 20 min 20 min 20 min    Paraffin with Stretch 15 min   15 min 15 min 15 min    Retrograde massage, Friction Scar massage, Joint Mobilization   15 min 15 min 15 min 15 min    Scarf Curl 5 min   5 min 5 min 5 min    Washer Game 5 min 5 min 5 min 5 min    Individual Gripper          Hand [place]          Cones          Pegs          Clothes Pins          A-R Bar          Exerstick          Velcro-Roll          AROM EX 10 min 10 min 10 min 10 min    RESISTIVE EXERCISES Weight/ Sets/Reps   Weight/ Sets/Reps Weight/ Sets/Reps Weight/ Sets/Reps Weight/ Sets/Reps   WEIGHT WELL        Sup/Pro        UD/RD        Wrist Flex/Ext        Free Weights          UBE(Minutes)          Nautilus        Compound Row        Vertical Chest        Overhead Press                    HEP: As above; handouts given to patient for all exercises. Therapeutic Modalities:         Left Wrist Heat  Type: Paraffin bath (with a finger flexion stretch)  Duration: 15 minutes  Patient Position: Sitting                                     Joint Mobilization:        Treatment Times:  · Therapeutic Exercise: 30 minutes  · Manual Therapy: 15 minutes  · Parafin: 15 minutes  · Whirlpool:  minutes  · Other:  minutes     Treatment/Session Assessment:    · Response to Treatment:  Patients tolerated treatment well with no complications. Upon completion of treatment, skin condition was normal..  · Compliance with Program/Exercises: Will assess as treatment progresses. · Recommendations/Intent for next treatment session: \"Next visit will focus on advancements to more challenging activities\". Will continue per MD.  Total Treatment Duration:  OT Patient Time In/Time Out  Time In: 0900  Time Out: [address] OT

## 2017-03-10 ENCOUNTER — HOSPITAL ENCOUNTER (OUTPATIENT)
Dept: MAMMOGRAPHY | Age: 67
Discharge: HOME | End: 2017-03-10
Attending: INTERNAL MEDICINE
Payer: MEDICARE

## 2017-03-10 ENCOUNTER — HOSPITAL ENCOUNTER (OUTPATIENT)
Dept: CT IMAGING | Age: 67
Discharge: HOME | End: 2017-03-10
Attending: OTOLARYNGOLOGY
Payer: MEDICARE

## 2017-03-10 DIAGNOSIS — M81.0 OSTEOPOROSIS: ICD-10-CM

## 2017-03-10 DIAGNOSIS — R43.0 ANOSMIA: ICD-10-CM

## 2017-03-10 PROCEDURE — 70486 CT MAXILLOFACIAL W/O DYE: CPT

## 2017-03-10 PROCEDURE — 77080 DXA BONE DENSITY AXIAL: CPT

## 2017-03-14 ENCOUNTER — HOSPITAL ENCOUNTER (OUTPATIENT)
Dept: PHYSICAL THERAPY | Age: 67
Discharge: HOME | End: 2017-03-14
Payer: MEDICARE

## 2017-03-14 PROCEDURE — 97110 THERAPEUTIC EXERCISES: CPT

## 2017-03-14 PROCEDURE — 97140 MANUAL THERAPY 1/> REGIONS: CPT

## 2017-03-14 PROCEDURE — 97018 PARAFFIN BATH THERAPY: CPT

## 2017-03-14 NOTE — PROGRESS NOTES
Ab Garg  : 1950 [facility] [place] [address], [address] [facility]  [phone]   [phone]         OUTPATIENT OCCUPATIONAL THERAPY: Daily Note 3/14/2017    ICD-10: Treatment Diagnosis: Pain in left hand (M79.642)Stiffness of left hand, not elsewhere classified (M25.642)  Precautions/Allergies:   Pcn [penicillins] and Sulfa (sulfonamide antibiotics)   Fall Risk Score: 0 (? 5 = High Risk)  MD Orders: Evaluate and treat: fabricate CMC splint MEDICAL/REFERRING DIAGNOSIS:   Unilateral primary osteoarthritis of first carpometacarpal joint, left hand [M18.12]   DATE OF ONSET: several years ago   REFERRING PHYSICIAN: Lucero Crockett MD  RETURN PHYSICIAN APPOINTMENT: 4 weeks     INITIAL ASSESSMENT:  Ms. Kristopher Hubbard presents with decreased functional use, strength and range of motion of her left hand and upper extremity that is affecting her independence with activities of daily living and ability to perform job tasks. I feel that Ms. Kristopher Hubbard will benefit from skilled occupational therapy to maximize the functional use of her hand and upper extremity in daily activities and work tasks. PLAN OF CARE:   PROBLEM LIST:  1. Pain in left hand. 2. Decreased motion in left hand. 3. Decreased strength in left hand. INTERVENTIONS PLANNED:  1. Modalities that may include fluidotherapy, paraffin, ultrasound, and light therapy. 2. Therapeutic exercise including a home exercise program.  3. Manual therapy. 4. Therapeutic activities. TREATMENT PLAN:  Effective Dates: 2017 TO 2017. Frequency/Duration: 2 times a week for 10 weeks  GOALS: (Goals have been discussed and agreed upon with patient.)  Short-Term Functional Goals: Time Frame: 4 weeks  1. Decrease pain to 5 to allow patient to perform self care tasks. 2. Increase motion in left hand by 20 degrees to improve functional use of upper extremity in ADL activities.   3. Increase strength in left hand by 10 pounds to allow patient to  and lift objects during self care activities. Discharge Goals: Time Frame: 12 weeks  1. Decrease pain to 2 to allow patient to perform all household  tasks. 2. Increase motion in left hand by 30 degreees to allow patient to perform all ADL activities. 3. Increase strength in left hand by 20 pounds to allow patient to , lift, hold, and carry heavy objects. Rehabilitation Potential For Stated Goals: Good  Regarding Daniel Mastersonnadir's therapy, I certify that the treatment plan above will be carried out by a therapist or under their direction. Thank you for this referral,  Navdeep Acevedo, OT       Referring Physician Signature: Sanjuanita Terry MD _________________________  Date _________            The information in this section was collected on 2/21/2017 (except where otherwise noted). OCCUPATIONAL PROFILE & HISTORY:   History of Present Injury/Illness (Reason for Referral): The patient has had increasing pain in her left thumb for several years. Past Medical History/Comorbidities:   Ms. Nabil Laguerre  has a past medical history of Anemia (2015); Anxiety (5/26/2015); Chronic insomnia (5/26/2015); Chronic pain; Depression (5/26/2015); Genital herpes, unspecified (5/26/2015); Hypertriglyceridemia (5/26/2015); Hypothyroidism (5/26/2015); Osteoarthritis (5/26/2015); Sciatica (5/26/2015); Sinus problem; and Sleep related leg cramps (5/26/2015). Ms. Nabil Laguerre  has a past surgical history that includes dilation and curettage (2006) and arthroplasty. Social History/Living Environment:      Prior Level of Function/Work/Activity:  Independent    Current Medications:    Current Outpatient Prescriptions:     calcium-cholecalciferol, d3, (CALCIUM 600 + D) 600-125 mg-unit tab, Take  by mouth daily. Hold all vitamins for 7 days prior to surgery per anesthesia protocol, Disp: , Rfl:     levothyroxine (SYNTHROID) 88 mcg tablet, Take 1 Tab by mouth Daily (before breakfast).  Indications: hypothyroidism, Disp: 90 Tab, Rfl: 4    sertraline (ZOLOFT) 100 mg tablet, Take 1 Tab by mouth daily. Indications: ANXIETY WITH DEPRESSION (Patient taking differently: Take 100 mg by mouth every evening. Indications: ANXIETY WITH DEPRESSION), Disp: 90 Tab, Rfl: 3    alendronate (FOSAMAX) 70 mg tablet, Take 1 Tab by mouth every seven (7) days. , Disp: 12 Tab, Rfl: 3    ALPRAZolam (XANAX) 0.5 mg tablet, Take 1 Tab by mouth nightly as needed for Anxiety. Max Daily Amount: 0.5 mg. (Patient taking differently: Take 0.5 mg by mouth daily as needed for Anxiety.), Disp: 90 Tab, Rfl: 1    acyclovir (ZOVIRAX) 400 mg tablet, Take 1 Tab by mouth two (2) times a day., Disp: 180 Tab, Rfl: 3    cholecalciferol (VITAMIN D3) 1,000 unit cap, Take 1,000 Units by mouth two (2) times a day. Hold all vitamins for 7 days prior to surgery per anesthesia protocol, Disp: , Rfl:     nabumetone (RELAFEN) 500 mg tablet, Take 1 Tab by mouth two (2) times a day. Take with food for arthritis pain  Indications: OSTEOARTHRITIS (Patient taking differently: Take 500 mg by mouth as needed. Hold all vitamins for 7 days prior to surgery per anesthesia protocol  Indications: OSTEOARTHRITIS), Disp: 180 Tab, Rfl: 4    ferrous sulfate (IRON) 325 mg (65 mg iron) tablet, Take 325 mg by mouth Daily (before breakfast). , Disp: , Rfl:    Date Last Reviewed: 3/14/2017    Number of medical conditions (excluding presenting problem) that affect the Plan of Care: Brief history (0):  LOW COMPLEXITY   ASSESSMENT OF OCCUPATIONAL PERFORMANCE:   RANGE OF MOTION:     · AROM:       Measurements not taken due to recent surgery. STRENGTH: Not tested yet. SENSATION:             Physical Skills Involved:  1. Range of Motion  2. Strength  3. Fine or Gross Motor Coordination  4. Sensation  5. Dexterity Cognitive Skills Affected (resulting in the inability to perform in a timely and safe manner):   1. none Psychosocial Skills Affected:  1. none   Number of elements that affect the Plan of Care: 1-3:  LOW COMPLEXITY   CLINICAL DECISION MAKING:   Outcome Measure: Tool Used: Disabilities of the Arm, Shoulder and Hand (DASH) Questionnaire - Quick Version  Score:  Initial: 43/55  Most Recent: X/55 (Date: -- )   Interpretation of Score: The DASH is designed to measure the activities of daily living in person's with upper extremity dysfunction or pain. Each section is scored on a 1-5 scale, 5 representing the greatest disability. The scores of each section are added together for a total score of 55. Score 11 12-19 20-28 29-37 38-45 46-54 55   Modifier CH CI CJ CK CL CM CN     ? Carrying, Moving, and Handling Objects:     - CURRENT STATUS: CL - 60%-79% impaired, limited or restricted    - GOAL STATUS: CJ - 20%-39% impaired, limited or restricted    - D/C STATUS:  ---------------To be determined---------------    Medical Necessity:   · Patient is expected to demonstrate progress in strength and range of motion to decrease assistance required with ADL and household activities. .  Reason for Services/Other Comments:  · The patient has limited motion ,strength and function in her left hand. .  Clinical Decision-Making Assessment:     Clinical Decision-Making: LOW COMPLEXITY   TREATMENT:   (In addition to Assessment/Re-Assessment sessions the following treatments were rendered)  Pre-treatment Symptoms/Complaints:    Pain: Initial:   Pain Intensity 1: 1  Pain Location 1: Hand, Wrist  Pain Orientation 1: Left  Post Session:  3     The patient was instructed in a home exercise program  The patient was provided with a removable CMC splint. Patient stated \"I am not having any pain. \"    Manual Therapy: (Soft Tissue Mobilization Duration  Duration: 15 Minutes  Duration: 15 Minutes): Technique: Retrograde massage  Tissue Mobilized: Scar/adhesion  LUE Soft Tissue Mobilization: Yes  Technique: Retrograde massage  Tissue Mobilized: Scar/adhesion   Therapeutic Exercise: : The patient's home exercise program was reviewed. Date:  2/23/17 Date:  2/28/17 Date:  3/2/17 Date:  3/6/17 Date:  3/14/17   Activity/Exercise Parameters Parameters Parameters Parameters Parameters   AROM during Fluidotherapy 20 min 20 min 20 min 20 min 20 min   Paraffin with Stretch 15 min   15 min 15 min 15 min 15 min   Retrograde massage, Friction Scar massage, Joint Mobilization   15 min 15 min 15 min 15 min 15 min   Scarf Curl 5 min   5 min 5 min 5 min 5 min   Washer Game 5 min 5 min 5 min 5 min 5 min   Individual Gripper          Hand [place]          [place]          Pegs          Clothes Pins          A-R Bar          Exerstick          Velcro-Roll          AROM EX 10 min 10 min 10 min 10 min 10 min   RESISTIVE EXERCISES Weight/ Sets/Reps   Weight/ Sets/Reps Weight/ Sets/Reps Weight/ Sets/Reps Weight/ Sets/Reps   WEIGHT WELL        Sup/Pro        UD/RD        Wrist Flex/Ext        Free Weights          UBE(Minutes)          Nautilus        Compound Row        Vertical Chest        Overhead Press                    HEP: As above; handouts given to patient for all exercises. Therapeutic Modalities:         Left Wrist Heat  Type: Fluidotherapy (while perfoming AROM ex)  Duration: 20 minutes  Patient Position: Sitting                                     Joint Mobilization:        Treatment Times:  · Therapeutic Exercise: 30 minutes  · Manual Therapy: 15 minutes  · Parafin: 15 minutes  · Whirlpool:  minutes  · Other:  minutes     Treatment/Session Assessment:    · Response to Treatment:  Patients tolerated treatment well with no complications. Upon completion of treatment, skin condition was normal..  · Compliance with Program/Exercises: Will assess as treatment progresses. · Recommendations/Intent for next treatment session: \"Next visit will focus on advancements to more challenging activities\". Will continue per MD.  Total Treatment Duration:  OT Patient Time In/Time Out  Time In: 0200  Time Out: [address], OT

## 2017-03-16 ENCOUNTER — APPOINTMENT (OUTPATIENT)
Dept: PHYSICAL THERAPY | Age: 67
End: 2017-03-16
Payer: MEDICARE

## 2017-03-21 ENCOUNTER — HOSPITAL ENCOUNTER (OUTPATIENT)
Dept: PHYSICAL THERAPY | Age: 67
Discharge: HOME | End: 2017-03-21
Payer: MEDICARE

## 2017-03-21 PROCEDURE — 97110 THERAPEUTIC EXERCISES: CPT

## 2017-03-21 PROCEDURE — 97018 PARAFFIN BATH THERAPY: CPT

## 2017-03-21 PROCEDURE — 97140 MANUAL THERAPY 1/> REGIONS: CPT

## 2017-03-22 NOTE — PROGRESS NOTES
Mallory Sauceda  : 1950 [facility], [address] [address],[address], [place] [address].  [phone]   [phone]         OUTPATIENT OCCUPATIONAL THERAPY: Daily Note 3/21/2017    ICD-10: Treatment Diagnosis: Pain in left hand (M79.642)Stiffness of left hand, not elsewhere classified (M25.642)  Precautions/Allergies:   Pcn [penicillins] and Sulfa (sulfonamide antibiotics)   Fall Risk Score: 0 (? 5 = High Risk)  MD Orders: Evaluate and treat: fabricate CMC splint MEDICAL/REFERRING DIAGNOSIS:   Unilateral primary osteoarthritis of first carpometacarpal joint, left hand [M18.12]   DATE OF ONSET: several years ago   REFERRING PHYSICIAN: Sanjuanita Terry MD  RETURN PHYSICIAN APPOINTMENT: 4 weeks     INITIAL ASSESSMENT:  Ms. Nabil Laguerre presents with decreased functional use, strength and range of motion of her left hand and upper extremity that is affecting her independence with activities of daily living and ability to perform job tasks. I feel that Ms. Nabil Laguerre will benefit from skilled occupational therapy to maximize the functional use of her hand and upper extremity in daily activities and work tasks. PLAN OF CARE:   PROBLEM LIST:  1. Pain in left hand. 2. Decreased motion in left hand. 3. Decreased strength in left hand. INTERVENTIONS PLANNED:  1. Modalities that may include fluidotherapy, paraffin, ultrasound, and light therapy. 2. Therapeutic exercise including a home exercise program.  3. Manual therapy. 4. Therapeutic activities. TREATMENT PLAN:  Effective Dates: 2017 TO 2017. Frequency/Duration: 2 times a week for 10 weeks  GOALS: (Goals have been discussed and agreed upon with patient.)  Short-Term Functional Goals: Time Frame: 4 weeks  1. Decrease pain to 5 to allow patient to perform self care tasks. 2. Increase motion in left hand by 20 degrees to improve functional use of upper extremity in ADL activities.   3. Increase strength in left hand by 10 pounds to allow patient to  and lift objects during self care activities. Discharge Goals: Time Frame: 12 weeks  1. Decrease pain to 2 to allow patient to perform all household  tasks. 2. Increase motion in left hand by 30 degreees to allow patient to perform all ADL activities. 3. Increase strength in left hand by 20 pounds to allow patient to , lift, hold, and carry heavy objects. Rehabilitation Potential For Stated Goals: Good  Regarding Kirit Rocha's therapy, I certify that the treatment plan above will be carried out by a therapist or under their direction. Thank you for this referral,  Jessica Escoto, OT       Referring Physician Signature: Lidya Rodriguez MD _________________________  Date _________            The information in this section was collected on 2/21/2017 (except where otherwise noted). OCCUPATIONAL PROFILE & HISTORY:   History of Present Injury/Illness (Reason for Referral): The patient has had increasing pain in her left thumb for several years. Past Medical History/Comorbidities:   Ms. Sierra Ferguson  has a past medical history of Anemia (2015); Anxiety (5/26/2015); Chronic insomnia (5/26/2015); Chronic pain; Depression (5/26/2015); Genital herpes, unspecified (5/26/2015); Hypertriglyceridemia (5/26/2015); Hypothyroidism (5/26/2015); Osteoarthritis (5/26/2015); Sciatica (5/26/2015); Sinus problem; and Sleep related leg cramps (5/26/2015). Ms. Sierra Ferguson  has a past surgical history that includes dilation and curettage (2006) and arthroplasty. Social History/Living Environment:      Prior Level of Function/Work/Activity:  Independent    Current Medications:    Current Outpatient Prescriptions:     calcium-cholecalciferol, d3, (CALCIUM 600 + D) 600-125 mg-unit tab, Take  by mouth daily. Hold all vitamins for 7 days prior to surgery per anesthesia protocol, Disp: , Rfl:     levothyroxine (SYNTHROID) 88 mcg tablet, Take 1 Tab by mouth Daily (before breakfast).  Indications: hypothyroidism, Disp: 90 Tab, Rfl: 4    sertraline (ZOLOFT) 100 mg tablet, Take 1 Tab by mouth daily. Indications: ANXIETY WITH DEPRESSION (Patient taking differently: Take 100 mg by mouth every evening. Indications: ANXIETY WITH DEPRESSION), Disp: 90 Tab, Rfl: 3    alendronate (FOSAMAX) 70 mg tablet, Take 1 Tab by mouth every seven (7) days. , Disp: 12 Tab, Rfl: 3    ALPRAZolam (XANAX) 0.5 mg tablet, Take 1 Tab by mouth nightly as needed for Anxiety. Max Daily Amount: 0.5 mg. (Patient taking differently: Take 0.5 mg by mouth daily as needed for Anxiety.), Disp: 90 Tab, Rfl: 1    acyclovir (ZOVIRAX) 400 mg tablet, Take 1 Tab by mouth two (2) times a day., Disp: 180 Tab, Rfl: 3    cholecalciferol (VITAMIN D3) 1,000 unit cap, Take 1,000 Units by mouth two (2) times a day. Hold all vitamins for 7 days prior to surgery per anesthesia protocol, Disp: , Rfl:     nabumetone (RELAFEN) 500 mg tablet, Take 1 Tab by mouth two (2) times a day. Take with food for arthritis pain  Indications: OSTEOARTHRITIS (Patient taking differently: Take 500 mg by mouth as needed. Hold all vitamins for 7 days prior to surgery per anesthesia protocol  Indications: OSTEOARTHRITIS), Disp: 180 Tab, Rfl: 4    ferrous sulfate (IRON) 325 mg (65 mg iron) tablet, Take 325 mg by mouth Daily (before breakfast). , Disp: , Rfl:    Date Last Reviewed: 3/22/2017    Number of medical conditions (excluding presenting problem) that affect the Plan of Care: Brief history (0):  LOW COMPLEXITY   ASSESSMENT OF OCCUPATIONAL PERFORMANCE:   RANGE OF MOTION:     · AROM:       Measurements not taken due to recent surgery. STRENGTH: Not tested yet. SENSATION:             Physical Skills Involved:  1. Range of Motion  2. Strength  3. Fine or Gross Motor Coordination  4. Sensation  5. Dexterity Cognitive Skills Affected (resulting in the inability to perform in a timely and safe manner):   1. none Psychosocial Skills Affected:  1. none   Number of elements that affect the Plan of Care: 1-3:  LOW COMPLEXITY   CLINICAL DECISION MAKING:   Outcome Measure: Tool Used: Disabilities of the Arm, Shoulder and Hand (DASH) Questionnaire - Quick Version  Score:  Initial: 43/55  Most Recent: X/55 (Date: -- )   Interpretation of Score: The DASH is designed to measure the activities of daily living in person's with upper extremity dysfunction or pain. Each section is scored on a 1-5 scale, 5 representing the greatest disability. The scores of each section are added together for a total score of 55. Score 11 12-19 20-28 29-37 38-45 46-54 55   Modifier CH CI CJ CK CL CM CN     ? Carrying, Moving, and Handling Objects:     - CURRENT STATUS: CL - 60%-79% impaired, limited or restricted    - GOAL STATUS: CJ - 20%-39% impaired, limited or restricted    - D/C STATUS:  ---------------To be determined---------------    Medical Necessity:   · Patient is expected to demonstrate progress in strength and range of motion to decrease assistance required with ADL and household activities. .  Reason for Services/Other Comments:  · The patient has limited motion ,strength and function in her left hand. .  Clinical Decision-Making Assessment:     Clinical Decision-Making: LOW COMPLEXITY   TREATMENT:   (In addition to Assessment/Re-Assessment sessions the following treatments were rendered)  Pre-treatment Symptoms/Complaints:    Pain: Initial:   Pain Intensity 1: 1  Pain Location 1: Hand  Pain Orientation 1: Left  Post Session:  3     The patient was instructed in a home exercise program  The patient was provided with a removable CMC splint. Patient stated \"I just have stiffness, no pain. \"    Manual Therapy: (Soft Tissue Mobilization Duration  Duration: 15 Minutes  Duration: 15 Minutes): Technique: Retrograde massage  Tissue Mobilized: Scar/adhesion  LUE Soft Tissue Mobilization: Yes  Technique: Retrograde massage  Tissue Mobilized: Scar/adhesion   Therapeutic Exercise: : The patient's home exercise program was reviewed. Date:  3/21/17 Date:  2/28/17 Date:  3/2/17 Date:  3/6/17 Date:  3/14/17   Activity/Exercise Parameters Parameters Parameters Parameters Parameters   AROM during Fluidotherapy 20 min 20 min 20 min 20 min 20 min   Paraffin with Stretch 15 min   15 min 15 min 15 min 15 min   Retrograde massage, Friction Scar massage, Joint Mobilization   15 min 15 min 15 min 15 min 15 min   Scarf Curl 5 min   5 min 5 min 5 min 5 min   Washer Game 5 min 5 min 5 min 5 min 5 min   Individual Gripper          Hand [place]          Cones          Pegs          Clothes Pins          A-R Bar          Exerstick          Velcro-Roll          AROM EX 10 min 10 min 10 min 10 min 10 min   RESISTIVE EXERCISES Weight/ Sets/Reps   Weight/ Sets/Reps Weight/ Sets/Reps Weight/ Sets/Reps Weight/ Sets/Reps   WEIGHT WELL        Sup/Pro        UD/RD        Wrist Flex/Ext        Free Weights          UBE(Minutes)          Nautilus        Compound Row        Vertical Chest        Overhead Press                    HEP: As above; handouts given to patient for all exercises. Therapeutic Modalities:         Left Wrist Heat  Type: Paraffin bath (with a thumb flexion stretch)  Duration: 15 minutes  Patient Position: Sitting                                     Joint Mobilization:        Treatment Times:  · Therapeutic Exercise: 30 minutes  · Manual Therapy: 15 minutes  · Parafin: 15 minutes  · Whirlpool:  minutes  · Other:  minutes     Treatment/Session Assessment:    · Response to Treatment:  Patients tolerated treatment well with no complications. Upon completion of treatment, skin condition was normal..  · Compliance with Program/Exercises: Will assess as treatment progresses. · Recommendations/Intent for next treatment session:  \"Next visit will focus on advancements to more challenging activities\"MD progress note completed. .Will continue per MD.  Total Treatment Duration:  OT Patient Time In/Time Out  Time In: 0900  Time Out: [address] OT

## 2017-03-26 NOTE — OP NOTES
DATE OF SURGERY  1/30/17      Preoperative diagnosis: Primary osteoarthritis of first carpometacarpal joint of left hand [M18.12]     Postoperative diagnosis: Primary osteoarthritis of first carpometacarpal joint of left hand [M18.12]     Surgeon(s) and Role:  * Torrey Murillo MD - Primary      Anesthesia: Regional Local with MAC.      Procedures: Procedure(s):  LEFT THUMB ARTHROPLASTY / PLEXUS   Left thumb APL ligament reconstruction  Left thumb MP joint volar capsulodesis utilizing Mitech suture anchor     EBL/IV FLUIDS: Per Anesthesia.      COMPLICATIONS: None.      DISPOSITION: Stable to recovery room.      INDICATIONS FOR PROCEDURE: The patient is a pleasant 19-year-old Female with history of left Thumb arthritis and MP joint hyperextensionthat has failed nonoperative measures. After both operative and nonoperative treatment options were discussed, the decision was made to go ahead with a Left thumb arthroplasty, left thumb APL ligament reconstruction, possible left thumb volar MP joint capsulodesis. Risks and benefits of the procedure were discussed including, but not limited to bleeding, infection, injury to adjacent structures consisting of tendon, artery, and nerve, need for additional procedures, wound dehiscence, scar formation, incomplete resolution of symptoms, Thumb CMC subsidence, possible need for MP joint fusion which she did not want to perform today,recurrence of symptoms, and transient neuropraxia. Informed consent was obtained.      PROCEDURE IN DETAIL: The patient was seen and marked in the preoperative suite. The patient was taken back to the OR, placed on the table in supine position with Left upper extremities on hand tables. Left upper extremities were prepped and draped in standard sterile fashion.  A formal timeout was performed confirming patient identification, preoperative antibiotics, and planned operative procedure.     We turned our attention to the first portion of the procedure the thumb arthroplasty. A standard incision was made over the [facility] joint. We circumferentially dissected out the [facility] joint specifically the trapezium we had to excise the trapezium in piecemeal manner since it was so degenerated and then. The trapezium was excised in its entirety we irrigated copiously with normal saline. We checked the STT joint there was not significant arthritis so we did not have to perform a partial trapezoid ectomy.     Next under separate procedure we performed the APL ligament reconstruction. We harvested the APL in standard fashion making secondary incision proximally leaving it distally attached we drilled through the proximal metacarpal of the thumb passing the tendon and then through the proximal index metacarpal passing the tendon and tying it back via a Pulvertaft weave Into the ECRB. We then pinned the thumb metacarpal to prevent subsidence.     After we got her out to length we assessed her MP hyperextension was not acceptable. We then turned our attention to the third portion of the procedure. Next under separate incision we performed a mid axial incision to the radial side of the MP joint we protected the digital nerve and flexor tendons we placed the Mytec suture anchor proximal to the volar capsule releasing the proximal edge of the volar capsule and then tying it back in a tighter fashion utilizing the anchor. This tightened up her volar capsule well performing a capsulodesis.     We irrigated all incisions we closed with Monocryl subcutaneously on some and a running subcuticular Prolene and Dermabond glue.  Soft sterile dressing was placed the pin was cut well padded she was placed into a thumb spica splint the tourniquet was let down and the patient was taken to the recovery room having tolerated procedure well.     Postoperative care and follow-up in 2 weeks for suture removal transition to a removable thumb spica splint.     Closure: Primary     Complications: None      Signed By: Sonal Sainz MD  [place] [facility]

## 2017-03-28 ENCOUNTER — HOSPITAL ENCOUNTER (OUTPATIENT)
Dept: PHYSICAL THERAPY | Age: 67
Discharge: HOME | End: 2017-03-28
Payer: MEDICARE

## 2017-03-28 PROCEDURE — 97018 PARAFFIN BATH THERAPY: CPT

## 2017-03-28 PROCEDURE — 97140 MANUAL THERAPY 1/> REGIONS: CPT

## 2017-03-28 PROCEDURE — 97110 THERAPEUTIC EXERCISES: CPT

## 2017-03-29 NOTE — PROGRESS NOTES
Jayden Jimenez  : 1950 [facility] at [facility]  [address], [address] [address],[address], [address] [facility]  [phone]   [phone]         OUTPATIENT OCCUPATIONAL THERAPY: Daily Note 3/28/2017    ICD-10: Treatment Diagnosis: Pain in left hand (M79.642)Stiffness of left hand, not elsewhere classified (M25.642)  Precautions/Allergies:   Pcn [penicillins] and Sulfa (sulfonamide antibiotics)   Fall Risk Score: 0 (? 5 = High Risk)  MD Orders: Evaluate and treat: fabricate CMC splint MEDICAL/REFERRING DIAGNOSIS:   Unilateral primary osteoarthritis of first carpometacarpal joint, left hand [M18.12]   DATE OF ONSET: several years ago   REFERRING PHYSICIAN: Aparna Greco MD  RETURN PHYSICIAN APPOINTMENT: 4 weeks     INITIAL ASSESSMENT:  Ms. Jose Onofre presents with decreased functional use, strength and range of motion of her left hand and upper extremity that is affecting her independence with activities of daily living and ability to perform job tasks. I feel that Ms. Jose Onofre will benefit from skilled occupational therapy to maximize the functional use of her hand and upper extremity in daily activities and work tasks. PLAN OF CARE:   PROBLEM LIST:  1. Pain in left hand. 2. Decreased motion in left hand. 3. Decreased strength in left hand. INTERVENTIONS PLANNED:  1. Modalities that may include fluidotherapy, paraffin, ultrasound, and light therapy. 2. Therapeutic exercise including a home exercise program.  3. Manual therapy. 4. Therapeutic activities. TREATMENT PLAN:  Effective Dates: 2017 TO 2017. Frequency/Duration: 2 times a week for 10 weeks  GOALS: (Goals have been discussed and agreed upon with patient.)  Short-Term Functional Goals: Time Frame: 4 weeks  1. Decrease pain to 5 to allow patient to perform self care tasks. 2. Increase motion in left hand by 20 degrees to improve functional use of upper extremity in ADL activities.   3. Increase strength in left hand by 10 pounds to allow patient to  and lift objects during self care activities. Discharge Goals: Time Frame: 12 weeks  1. Decrease pain to 2 to allow patient to perform all household  tasks. 2. Increase motion in left hand by 30 degreees to allow patient to perform all ADL activities. 3. Increase strength in left hand by 20 pounds to allow patient to , lift, hold, and carry heavy objects. Rehabilitation Potential For Stated Goals: Good  Regarding Cam Yang A Nott's therapy, I certify that the treatment plan above will be carried out by a therapist or under their direction. Thank you for this referral,  Kris Ma OT       Referring Physician Signature: Tre Moffett MD _________________________  Date _________            The information in this section was collected on 2/21/2017 (except where otherwise noted). OCCUPATIONAL PROFILE & HISTORY:   History of Present Injury/Illness (Reason for Referral): The patient has had increasing pain in her left thumb for several years. Past Medical History/Comorbidities:   Ms. Samra Cherry  has a past medical history of Anemia (2015); Anxiety (5/26/2015); Chronic insomnia (5/26/2015); Chronic pain; Depression (5/26/2015); Genital herpes, unspecified (5/26/2015); Hypertriglyceridemia (5/26/2015); Hypothyroidism (5/26/2015); Osteoarthritis (5/26/2015); Sciatica (5/26/2015); Sinus problem; and Sleep related leg cramps (5/26/2015). Ms. Samra Cherry  has a past surgical history that includes dilation and curettage (2006) and arthroplasty. Social History/Living Environment:      Prior Level of Function/Work/Activity:  Independent    Current Medications:    Current Outpatient Prescriptions:     calcium-cholecalciferol, d3, (CALCIUM 600 + D) 600-125 mg-unit tab, Take  by mouth daily. Hold all vitamins for 7 days prior to surgery per anesthesia protocol, Disp: , Rfl:     levothyroxine (SYNTHROID) 88 mcg tablet, Take 1 Tab by mouth Daily (before breakfast).  Indications: hypothyroidism, Disp: 90 Tab, Rfl: 4    sertraline (ZOLOFT) 100 mg tablet, Take 1 Tab by mouth daily. Indications: ANXIETY WITH DEPRESSION (Patient taking differently: Take 100 mg by mouth every evening. Indications: ANXIETY WITH DEPRESSION), Disp: 90 Tab, Rfl: 3    alendronate (FOSAMAX) 70 mg tablet, Take 1 Tab by mouth every seven (7) days. , Disp: 12 Tab, Rfl: 3    ALPRAZolam (XANAX) 0.5 mg tablet, Take 1 Tab by mouth nightly as needed for Anxiety. Max Daily Amount: 0.5 mg. (Patient taking differently: Take 0.5 mg by mouth daily as needed for Anxiety.), Disp: 90 Tab, Rfl: 1    acyclovir (ZOVIRAX) 400 mg tablet, Take 1 Tab by mouth two (2) times a day., Disp: 180 Tab, Rfl: 3    cholecalciferol (VITAMIN D3) 1,000 unit cap, Take 1,000 Units by mouth two (2) times a day. Hold all vitamins for 7 days prior to surgery per anesthesia protocol, Disp: , Rfl:     nabumetone (RELAFEN) 500 mg tablet, Take 1 Tab by mouth two (2) times a day. Take with food for arthritis pain  Indications: OSTEOARTHRITIS (Patient taking differently: Take 500 mg by mouth as needed. Hold all vitamins for 7 days prior to surgery per anesthesia protocol  Indications: OSTEOARTHRITIS), Disp: 180 Tab, Rfl: 4    ferrous sulfate (IRON) 325 mg (65 mg iron) tablet, Take 325 mg by mouth Daily (before breakfast). , Disp: , Rfl:    Date Last Reviewed: 3/29/2017    Number of medical conditions (excluding presenting problem) that affect the Plan of Care: Brief history (0):  LOW COMPLEXITY   ASSESSMENT OF OCCUPATIONAL PERFORMANCE:   RANGE OF MOTION:     · AROM:       Measurements not taken due to recent surgery. STRENGTH: Not tested yet. SENSATION:             Physical Skills Involved:  1. Range of Motion  2. Strength  3. Fine or Gross Motor Coordination  4. Sensation  5. Dexterity Cognitive Skills Affected (resulting in the inability to perform in a timely and safe manner):   1. none Psychosocial Skills Affected:  1. none   Number of elements that affect the Plan of Care: 1-3:  LOW COMPLEXITY   CLINICAL DECISION MAKING:   Outcome Measure: Tool Used: Disabilities of the Arm, Shoulder and Hand (DASH) Questionnaire - Quick Version  Score:  Initial: 43/55  Most Recent: X/55 (Date: -- )   Interpretation of Score: The DASH is designed to measure the activities of daily living in person's with upper extremity dysfunction or pain. Each section is scored on a 1-5 scale, 5 representing the greatest disability. The scores of each section are added together for a total score of 55. Score 11 12-19 20-28 29-37 38-45 46-54 55   Modifier CH CI CJ CK CL CM CN     ? Carrying, Moving, and Handling Objects:     - CURRENT STATUS: CL - 60%-79% impaired, limited or restricted    - GOAL STATUS: CJ - 20%-39% impaired, limited or restricted    - D/C STATUS:  ---------------To be determined---------------    Medical Necessity:   · Patient is expected to demonstrate progress in strength and range of motion to decrease assistance required with ADL and household activities. .  Reason for Services/Other Comments:  · The patient has limited motion ,strength and function in her left hand. .  Clinical Decision-Making Assessment:     Clinical Decision-Making: LOW COMPLEXITY   TREATMENT:   (In addition to Assessment/Re-Assessment sessions the following treatments were rendered)  Pre-treatment Symptoms/Complaints:    Pain: Initial:   Pain Intensity 1: 2  Pain Location 1: Hand  Pain Orientation 1: Left  Post Session:  3     The patient was instructed in a home exercise program  The patient was provided with a removable CMC splint. Patient stated \"Dr. Tasia De Santiago said we could begin strengthenig. \"    Manual Therapy: (Soft Tissue Mobilization Duration  Duration: 15 Minutes  Duration: 15 Minutes): Technique: Retrograde massage  Tissue Mobilized: Scar/adhesion  LUE Soft Tissue Mobilization: Yes  Technique: Retrograde massage  Tissue Mobilized: Scar/adhesion   Therapeutic Exercise: : The patient's home exercise program was reviewed. Date:  3/21/17 Date:  3/28/17 Date:  3/2/17 Date:  3/6/17 Date:  3/14/17   Activity/Exercise Parameters Parameters Parameters Parameters Parameters   AROM during Fluidotherapy 20 min 20 min 20 min 20 min 20 min   Paraffin with Stretch 15 min   15 min 15 min 15 min 15 min   Retrograde massage, Friction Scar massage, Joint Mobilization   15 min 15 min 15 min 15 min 15 min   Scarf Curl 5 min   5 min 5 min 5 min 5 min   Washer Game 5 min 5 min 5 min 5 min 5 min   Individual Gripper    15 reps      Hand [place]    15 reps      Cones          Pegs          Clothes Pins    15 reps      A-R Bar          Exerstick    30 reps      Velcro-Roll          AROM EX 10 min  10 min 10 min 10 min   RESISTIVE EXERCISES Weight/ Sets/Reps   Weight/ Sets/Reps Weight/ Sets/Reps Weight/ Sets/Reps Weight/ Sets/Reps   WEIGHT WELL        Sup/Pro        UD/RD        Wrist Flex/Ext        Free Weights          UBE(Minutes)          Nautilus        Compound Row        Vertical Chest        Overhead Press                    HEP: As above; handouts given to patient for all exercises. Therapeutic Modalities:         Left Wrist Heat  Type: Paraffin bath (with a thumb flexion stretch)  Duration: 15 minutes  Patient Position: Sitting                                     Joint Mobilization:        Treatment Times:  · Therapeutic Exercise: 30 minutes  · Manual Therapy: 15 minutes  · Parafin: 15 minutes  · Whirlpool:  minutes  · Other:  minutes     Treatment/Session Assessment:    · Response to Treatment:  Patients tolerated treatment well with no complications. Upon completion of treatment, skin condition was normal..  · Compliance with Program/Exercises: Will assess as treatment progresses. · Recommendations/Intent for next treatment session:  \"Next visit will focus on advancements to more challenging activities\". .Will continue per MD.  Total Treatment Duration:  OT Patient Time In/Time Out  Time In: 0200  Time Out: [address], OT

## 2017-04-03 ENCOUNTER — HOSPITAL ENCOUNTER (OUTPATIENT)
Dept: PHYSICAL THERAPY | Age: 67
Discharge: HOME | End: 2017-04-03
Payer: MEDICARE

## 2017-04-03 PROCEDURE — 97140 MANUAL THERAPY 1/> REGIONS: CPT

## 2017-04-03 PROCEDURE — 97110 THERAPEUTIC EXERCISES: CPT

## 2017-04-03 PROCEDURE — 97018 PARAFFIN BATH THERAPY: CPT

## 2017-04-03 NOTE — PROGRESS NOTES
Nina Boraj  : 1950 [facility] [place] [address], [address] [facility]  [phone]   [phone]         OUTPATIENT OCCUPATIONAL THERAPY: Daily Note 4/3/2017    ICD-10: Treatment Diagnosis: Pain in left hand (M79.642)Stiffness of left hand, not elsewhere classified (M25.642)  Precautions/Allergies:   Pcn [penicillins] and Sulfa (sulfonamide antibiotics)   Fall Risk Score: 0 (? 5 = High Risk)  MD Orders: Evaluate and treat: fabricate CMC splint MEDICAL/REFERRING DIAGNOSIS:   Unilateral primary osteoarthritis of first carpometacarpal joint, left hand [M18.12]   DATE OF ONSET: several years ago   REFERRING PHYSICIAN: Saint Natal, MD  RETURN PHYSICIAN APPOINTMENT: 4 weeks     INITIAL ASSESSMENT:  Ms. Holly Hopson presents with decreased functional use, strength and range of motion of her left hand and upper extremity that is affecting her independence with activities of daily living and ability to perform job tasks. I feel that Ms. Holly Hopson will benefit from skilled occupational therapy to maximize the functional use of her hand and upper extremity in daily activities and work tasks. PLAN OF CARE:   PROBLEM LIST:  1. Pain in left hand. 2. Decreased motion in left hand. 3. Decreased strength in left hand. INTERVENTIONS PLANNED:  1. Modalities that may include fluidotherapy, paraffin, ultrasound, and light therapy. 2. Therapeutic exercise including a home exercise program.  3. Manual therapy. 4. Therapeutic activities. TREATMENT PLAN:  Effective Dates: 2017 TO 2017. Frequency/Duration: 2 times a week for 10 weeks  GOALS: (Goals have been discussed and agreed upon with patient.)  Short-Term Functional Goals: Time Frame: 4 weeks  1. Decrease pain to 5 to allow patient to perform self care tasks. 2. Increase motion in left hand by 20 degrees to improve functional use of upper extremity in ADL activities.   3. Increase strength in left hand by 10 pounds to allow patient to  and lift objects during self care activities. Discharge Goals: Time Frame: 12 weeks  1. Decrease pain to 2 to allow patient to perform all household  tasks. 2. Increase motion in left hand by 30 degreees to allow patient to perform all ADL activities. 3. Increase strength in left hand by 20 pounds to allow patient to , lift, hold, and carry heavy objects. Rehabilitation Potential For Stated Goals: Good  Regarding Scott Mastersonnadir's therapy, I certify that the treatment plan above will be carried out by a therapist or under their direction. Thank you for this referral,  Natividad Pretty, OT       Referring Physician Signature: Mike Ozuna MD _________________________  Date _________            The information in this section was collected on 2/21/2017 (except where otherwise noted). OCCUPATIONAL PROFILE & HISTORY:   History of Present Injury/Illness (Reason for Referral): The patient has had increasing pain in her left thumb for several years. Past Medical History/Comorbidities:   Ms. Tk Whitehead  has a past medical history of Anemia (2015); Anxiety (5/26/2015); Chronic insomnia (5/26/2015); Chronic pain; Depression (5/26/2015); Genital herpes, unspecified (5/26/2015); Hypertriglyceridemia (5/26/2015); Hypothyroidism (5/26/2015); Osteoarthritis (5/26/2015); Sciatica (5/26/2015); Sinus problem; and Sleep related leg cramps (5/26/2015). Ms. Tk Whitehead  has a past surgical history that includes dilation and curettage (2006) and arthroplasty. Social History/Living Environment:      Prior Level of Function/Work/Activity:  Independent    Current Medications:    Current Outpatient Prescriptions:     calcium-cholecalciferol, d3, (CALCIUM 600 + D) 600-125 mg-unit tab, Take  by mouth daily. Hold all vitamins for 7 days prior to surgery per anesthesia protocol, Disp: , Rfl:     levothyroxine (SYNTHROID) 88 mcg tablet, Take 1 Tab by mouth Daily (before breakfast).  Indications: hypothyroidism, Disp: 90 Tab, Rfl: 4    sertraline (ZOLOFT) 100 mg tablet, Take 1 Tab by mouth daily. Indications: ANXIETY WITH DEPRESSION (Patient taking differently: Take 100 mg by mouth every evening. Indications: ANXIETY WITH DEPRESSION), Disp: 90 Tab, Rfl: 3    alendronate (FOSAMAX) 70 mg tablet, Take 1 Tab by mouth every seven (7) days. , Disp: 12 Tab, Rfl: 3    ALPRAZolam (XANAX) 0.5 mg tablet, Take 1 Tab by mouth nightly as needed for Anxiety. Max Daily Amount: 0.5 mg. (Patient taking differently: Take 0.5 mg by mouth daily as needed for Anxiety.), Disp: 90 Tab, Rfl: 1    acyclovir (ZOVIRAX) 400 mg tablet, Take 1 Tab by mouth two (2) times a day., Disp: 180 Tab, Rfl: 3    cholecalciferol (VITAMIN D3) 1,000 unit cap, Take 1,000 Units by mouth two (2) times a day. Hold all vitamins for 7 days prior to surgery per anesthesia protocol, Disp: , Rfl:     nabumetone (RELAFEN) 500 mg tablet, Take 1 Tab by mouth two (2) times a day. Take with food for arthritis pain  Indications: OSTEOARTHRITIS (Patient taking differently: Take 500 mg by mouth as needed. Hold all vitamins for 7 days prior to surgery per anesthesia protocol  Indications: OSTEOARTHRITIS), Disp: 180 Tab, Rfl: 4    ferrous sulfate (IRON) 325 mg (65 mg iron) tablet, Take 325 mg by mouth Daily (before breakfast). , Disp: , Rfl:    Date Last Reviewed: 4/3/2017    Number of medical conditions (excluding presenting problem) that affect the Plan of Care: Brief history (0):  LOW COMPLEXITY   ASSESSMENT OF OCCUPATIONAL PERFORMANCE:   RANGE OF MOTION:     · AROM:       Measurements not taken due to recent surgery. STRENGTH: Not tested yet. SENSATION:             Physical Skills Involved:  1. Range of Motion  2. Strength  3. Fine or Gross Motor Coordination  4. Sensation  5. Dexterity Cognitive Skills Affected (resulting in the inability to perform in a timely and safe manner):   1. none Psychosocial Skills Affected:  1. none   Number of elements that affect the Plan of Care: 1-3:  LOW COMPLEXITY   CLINICAL DECISION MAKING:   Outcome Measure: Tool Used: Disabilities of the Arm, Shoulder and Hand (DASH) Questionnaire - Quick Version  Score:  Initial: 43/55  Most Recent: X/55 (Date: -- )   Interpretation of Score: The DASH is designed to measure the activities of daily living in person's with upper extremity dysfunction or pain. Each section is scored on a 1-5 scale, 5 representing the greatest disability. The scores of each section are added together for a total score of 55. Score 11 12-19 20-28 29-37 38-45 46-54 55   Modifier CH CI CJ CK CL CM CN     ? Carrying, Moving, and Handling Objects:     - CURRENT STATUS: CL - 60%-79% impaired, limited or restricted    - GOAL STATUS: CJ - 20%-39% impaired, limited or restricted    - D/C STATUS:  ---------------To be determined---------------    Medical Necessity:   · Patient is expected to demonstrate progress in strength and range of motion to decrease assistance required with ADL and household activities. .  Reason for Services/Other Comments:  · The patient has limited motion ,strength and function in her left hand. .  Clinical Decision-Making Assessment:     Clinical Decision-Making: LOW COMPLEXITY   TREATMENT:   (In addition to Assessment/Re-Assessment sessions the following treatments were rendered)  Pre-treatment Symptoms/Complaints:    Pain: Initial:   Pain Intensity 1: 1  Pain Location 1: Hand  Pain Orientation 1: Left  Post Session:  3     The patient was instructed in a home exercise program  The patient was provided with a removable CMC splint. Patient stated \"Dr. Ian Ley said we could begin strengthenig. \"    Manual Therapy: (Soft Tissue Mobilization Duration  Duration: 15 Minutes  Duration: 15 Minutes): Technique: Retrograde massage  Tissue Mobilized: Scar/adhesion  LUE Soft Tissue Mobilization: Yes  Technique: Retrograde massage  Tissue Mobilized: Scar/adhesion   Therapeutic Exercise: : The patient's home exercise program was reviewed. Date:  3/21/17 Date:  3/28/17 Date:  4/3/17 Date:  3/6/17 Date:  3/14/17   Activity/Exercise Parameters Parameters Parameters Parameters Parameters   AROM during Fluidotherapy 20 min 20 min 20 min 20 min 20 min   Paraffin with Stretch 15 min   15 min 15 min 15 min 15 min   Retrograde massage, Friction Scar massage, Joint Mobilization   15 min 15 min 15 min 15 min 15 min   Scarf Curl 5 min   5 min 5 min 5 min 5 min   Washer Game 5 min 5 min 5 min 5 min 5 min   Individual Gripper    15 reps 20 reps     Hand [place]    15 reps 20 reps     Cones          Pegs          Clothes Pins    15 reps 20 reps     A-R Bar          Exerstick    30 reps 40 reps     Velcro-Roll          AROM EX 10 min   10 min 10 min   RESISTIVE EXERCISES Weight/ Sets/Reps   Weight/ Sets/Reps Weight/ Sets/Reps Weight/ Sets/Reps Weight/ Sets/Reps   WEIGHT WELL        Sup/Pro        UD/RD        Wrist Flex/Ext        Free Weights          UBE(Minutes)          Nautilus        Compound Row        Vertical Chest        Overhead Press                    HEP: As above; handouts given to patient for all exercises. Therapeutic Modalities:         Left Wrist Heat  Type: Paraffin bath (with a thumb flexion stretch)  Duration: 15 minutes  Patient Position: Sitting                                     Joint Mobilization:        Treatment Times:  · Therapeutic Exercise: 30 minutes  · Manual Therapy: 15 minutes  · Parafin: 15 minutes  · Whirlpool:  minutes  · Other:  minutes     Treatment/Session Assessment:    · Response to Treatment:  Patients tolerated treatment well with no complications. Upon completion of treatment, skin condition was normal..  · Compliance with Program/Exercises: Will assess as treatment progresses. · Recommendations/Intent for next treatment session:  \"Next visit will focus on advancements to more challenging activities\". .Will continue per MD.  Total Treatment Duration:  OT Patient Time In/Time Out  Time In: 0930  Time Out: [address] [facility],

## 2017-04-11 ENCOUNTER — APPOINTMENT (OUTPATIENT)
Dept: PHYSICAL THERAPY | Age: 67
End: 2017-04-11
Payer: MEDICARE

## 2017-04-13 ENCOUNTER — HOSPITAL ENCOUNTER (OUTPATIENT)
Dept: PHYSICAL THERAPY | Age: 67
Discharge: HOME | End: 2017-04-13
Payer: MEDICARE

## 2017-04-13 PROCEDURE — 97140 MANUAL THERAPY 1/> REGIONS: CPT

## 2017-04-13 PROCEDURE — 97110 THERAPEUTIC EXERCISES: CPT

## 2017-04-13 PROCEDURE — G8984 CARRY CURRENT STATUS: HCPCS

## 2017-04-13 PROCEDURE — G8985 CARRY GOAL STATUS: HCPCS

## 2017-04-13 PROCEDURE — 97018 PARAFFIN BATH THERAPY: CPT

## 2017-04-13 NOTE — PROGRESS NOTES
Cash Coyle  : 1950 [facility]  [address] [facility] [address], [facility]  [phone]   [phone]         OUTPATIENT OCCUPATIONAL THERAPY: Daily Note and Progress Report 2017    ICD-10: Treatment Diagnosis: Pain in left hand (M79.642)Stiffness of left hand, not elsewhere classified (M25.642)  Precautions/Allergies:   Pcn [penicillins] and Sulfa (sulfonamide antibiotics)   Fall Risk Score: 0 (? 5 = High Risk)  MD Orders: Evaluate and treat: fabricate CMC splint MEDICAL/REFERRING DIAGNOSIS:   Unilateral primary osteoarthritis of first carpometacarpal joint, left hand [M18.12]   DATE OF ONSET: several years ago   REFERRING PHYSICIAN: Fabienne Joya MD  RETURN PHYSICIAN APPOINTMENT: 4 weeks     INITIAL ASSESSMENT:  Ms. Trae Diggs presents with decreased functional use, strength and range of motion of her left hand and upper extremity that is affecting her independence with activities of daily living and ability to perform job tasks. I feel that Ms. Trae Diggs will benefit from skilled occupational therapy to maximize the functional use of her hand and upper extremity in daily activities and work tasks. PLAN OF CARE:   PROBLEM LIST:  1. Pain in left hand. 2. Decreased motion in left hand. 3. Decreased strength in left hand. INTERVENTIONS PLANNED:  1. Modalities that may include fluidotherapy, paraffin, ultrasound, and light therapy. 2. Therapeutic exercise including a home exercise program.  3. Manual therapy. 4. Therapeutic activities. TREATMENT PLAN:  Effective Dates: 2017 TO 2017. Frequency/Duration: 2 times a week for 10 weeks  GOALS: (Goals have been discussed and agreed upon with patient.)  Short-Term Functional Goals: Time Frame: 4 weeks  1. Decrease pain to 5 to allow patient to perform self care tasks. ( GOAL MET )  2. Increase motion in left hand by 20 degrees to improve functional use of upper extremity in ADL activities. ( GOAL MET )  3. Increase strength in left hand by 10 pounds to allow patient to  and lift objects during self care activities. ( GOAL MET )  Discharge Goals: Time Frame: 12 weeks  1. Decrease pain to 2 to allow patient to perform all household  Tasks. ( GOAL MET )  2. Increase motion in left hand by 30 degreees to allow patient to perform all ADL activities. ( GOAL MET )  3. Increase strength in left hand by 20 pounds to allow patient to , lift, hold, and carry heavy objects. ( GOAL NOT MET )  Rehabilitation Potential For Stated Goals: Good  Regarding Rhea Mastersonnadir's therapy, I certify that the treatment plan above will be carried out by a therapist or under their direction. Thank you for this referral,  Deborah Oswald, OT       Referring Physician Signature: Dayton Michele MD _________________________  Date _________            The information in this section was collected on 2/21/2017 (except where otherwise noted). OCCUPATIONAL PROFILE & HISTORY:   History of Present Injury/Illness (Reason for Referral): The patient has had increasing pain in her left thumb for several years. Past Medical History/Comorbidities:   Ms. Lakesha Gutierrez  has a past medical history of Anemia (2015); Anxiety (5/26/2015); Chronic insomnia (5/26/2015); Chronic pain; Depression (5/26/2015); Genital herpes, unspecified (5/26/2015); Hypertriglyceridemia (5/26/2015); Hypothyroidism (5/26/2015); Osteoarthritis (5/26/2015); Sciatica (5/26/2015); Sinus problem; and Sleep related leg cramps (5/26/2015). Ms. Lakesha Gutierrez  has a past surgical history that includes dilation and curettage (2006) and arthroplasty. Social History/Living Environment:      Prior Level of Function/Work/Activity:  Independent    Current Medications:    Current Outpatient Prescriptions:     calcium-cholecalciferol, d3, (CALCIUM 600 + D) 600-125 mg-unit tab, Take  by mouth daily.  Hold all vitamins for 7 days prior to surgery per anesthesia protocol, Disp: , Rfl:     levothyroxine (SYNTHROID) 88 mcg tablet, Take 1 Tab by mouth Daily (before breakfast). Indications: hypothyroidism, Disp: 90 Tab, Rfl: 4    sertraline (ZOLOFT) 100 mg tablet, Take 1 Tab by mouth daily. Indications: ANXIETY WITH DEPRESSION (Patient taking differently: Take 100 mg by mouth every evening. Indications: ANXIETY WITH DEPRESSION), Disp: 90 Tab, Rfl: 3    alendronate (FOSAMAX) 70 mg tablet, Take 1 Tab by mouth every seven (7) days. , Disp: 12 Tab, Rfl: 3    ALPRAZolam (XANAX) 0.5 mg tablet, Take 1 Tab by mouth nightly as needed for Anxiety. Max Daily Amount: 0.5 mg. (Patient taking differently: Take 0.5 mg by mouth daily as needed for Anxiety.), Disp: 90 Tab, Rfl: 1    acyclovir (ZOVIRAX) 400 mg tablet, Take 1 Tab by mouth two (2) times a day., Disp: 180 Tab, Rfl: 3    cholecalciferol (VITAMIN D3) 1,000 unit cap, Take 1,000 Units by mouth two (2) times a day. Hold all vitamins for 7 days prior to surgery per anesthesia protocol, Disp: , Rfl:     nabumetone (RELAFEN) 500 mg tablet, Take 1 Tab by mouth two (2) times a day. Take with food for arthritis pain  Indications: OSTEOARTHRITIS (Patient taking differently: Take 500 mg by mouth as needed. Hold all vitamins for 7 days prior to surgery per anesthesia protocol  Indications: OSTEOARTHRITIS), Disp: 180 Tab, Rfl: 4    ferrous sulfate (IRON) 325 mg (65 mg iron) tablet, Take 325 mg by mouth Daily (before breakfast). , Disp: , Rfl:    Date Last Reviewed: 4/13/2017    Number of medical conditions (excluding presenting problem) that affect the Plan of Care: Brief history (0):  LOW COMPLEXITY   ASSESSMENT OF OCCUPATIONAL PERFORMANCE:   RANGE OF MOTION:     · AROM:       Left thumb motion is as follows: MP 0/45, IP 0/60, abduction 60, extension 60 degrees. STRENGTH: Not tested yet. SENSATION:             Physical Skills Involved:  1. Range of Motion  2. Strength  3. Fine or Gross Motor Coordination  4. Sensation  5.  Dexterity Cognitive Skills Affected (resulting in the inability to perform in a timely and safe manner): 1. none Psychosocial Skills Affected:  1. none   Number of elements that affect the Plan of Care: 1-3:  LOW COMPLEXITY   CLINICAL DECISION MAKING:   Outcome Measure: Tool Used: Disabilities of the Arm, Shoulder and Hand (DASH) Questionnaire - Quick Version  Score:  Initial: 43/55  Most Recent: 15/55 (Date: 4/13/2017 )   Interpretation of Score: The DASH is designed to measure the activities of daily living in person's with upper extremity dysfunction or pain. Each section is scored on a 1-5 scale, 5 representing the greatest disability. The scores of each section are added together for a total score of 55. Score 11 12-19 20-28 29-37 38-45 46-54 55   Modifier CH CI CJ CK CL CM CN     ? Carrying, Moving, and Handling Objects:     - CURRENT STATUS: CL - 60%-79% impaired, limited or restricted    - GOAL STATUS: CJ - 20%-39% impaired, limited or restricted    - D/C STATUS:  ---------------To be determined---------------    Medical Necessity:   · Patient is expected to demonstrate progress in strength and range of motion to decrease assistance required with ADL and household activities. .  Reason for Services/Other Comments:  · The patient has limited motion ,strength and function in her left hand. .  Clinical Decision-Making Assessment:     Clinical Decision-Making: LOW COMPLEXITY   TREATMENT:   (In addition to Assessment/Re-Assessment sessions the following treatments were rendered)  Pre-treatment Symptoms/Complaints:    Pain: 0     Post Session:  3     The patient was instructed in a home exercise program    Patient stated \"Dr. Rebecca Lemos said we could begin strengthenig. \"    Manual Therapy: (Soft Tissue Mobilization Duration  Duration: 15 Minutes  Duration: 15 Minutes): Technique: Retrograde massage  Tissue Mobilized: Scar/adhesion  ALEXYSE Soft Tissue Mobilization: Yes  Technique: Retrograde massage  Tissue Mobilized: Scar/adhesion Therapeutic Exercise:                                                                               : The patient's home exercise program was reviewed. Date:  3/21/17 Date:  3/28/17 Date:  4/3/17 Date:  4/13/17 Date:  3/14/17   Activity/Exercise Parameters Parameters Parameters Parameters Parameters   AROM during Fluidotherapy 20 min 20 min 20 min 20 min 20 min   Paraffin with Stretch 15 min   15 min 15 min 15 min 15 min   Retrograde massage, Friction Scar massage, Joint Mobilization   15 min 15 min 15 min 15 min 15 min   Scarf Curl 5 min   5 min 5 min 5 min 5 min   Washer Game 5 min 5 min 5 min 5 min 5 min   Individual Gripper    15 reps 20 reps 20 reps    Hand [place]    15 reps 20 reps 20 reps    Cones          Pegs          Clothes Pins    15 reps 20 reps 20 reps    A-R Bar          Exerstick    30 reps 40 reps 40 reps    Velcro-Roll          AROM EX 10 min    10 min   RESISTIVE EXERCISES Weight/ Sets/Reps   Weight/ Sets/Reps Weight/ Sets/Reps Weight/ Sets/Reps Weight/ Sets/Reps   WEIGHT WELL        Sup/Pro        UD/RD        Wrist Flex/Ext        Free Weights          UBE(Minutes)          Nautilus        Compound Row        Vertical Chest        Overhead Press                    HEP: As above; handouts given to patient for all exercises. Therapeutic Modalities:         Left Wrist Heat  Type: Paraffin bath (with a thumb flexion stretch)  Duration: 15 minutes  Patient Position: Sitting                                     Joint Mobilization:        Treatment Times:  · Therapeutic Exercise: 30 minutes  · Manual Therapy: 15 minutes  · Parafin: 15 minutes  · Whirlpool:  minutes  · Other:  minutes     Treatment/Session Assessment:    · Response to Treatment:  Patients tolerated treatment well with no complications. Upon completion of treatment, skin condition was normal..  · Compliance with Program/Exercises:  Will assess as treatment progresses. · Recommendations/Intent for next treatment session: \"Next visit will focus on advancements to more challenging activities\". .Will continue per MD.  Total Treatment Duration:       Anh Armas OT

## 2017-04-17 ENCOUNTER — HOSPITAL ENCOUNTER (OUTPATIENT)
Dept: PHYSICAL THERAPY | Age: 67
Discharge: HOME | End: 2017-04-17
Payer: MEDICARE

## 2017-04-17 PROCEDURE — G8985 CARRY GOAL STATUS: HCPCS

## 2017-04-17 PROCEDURE — 97140 MANUAL THERAPY 1/> REGIONS: CPT

## 2017-04-17 PROCEDURE — 97018 PARAFFIN BATH THERAPY: CPT

## 2017-04-17 PROCEDURE — G8986 CARRY D/C STATUS: HCPCS

## 2017-04-17 PROCEDURE — 97110 THERAPEUTIC EXERCISES: CPT

## 2017-04-17 NOTE — PROGRESS NOTES
Christina Hendersonler  : [facility] [place] [address] [facility], [place] [address].  [phone]   [phone]         OUTPATIENT OCCUPATIONAL THERAPY: Daily Note and Discharge 2017    ICD-10: Treatment Diagnosis: Pain in left hand (M79.642)Stiffness of left hand, not elsewhere classified (M25.642)  Precautions/Allergies:   Pcn [penicillins] and Sulfa (sulfonamide antibiotics)   Fall Risk Score: 0 (? 5 = High Risk)  MD Orders: Evaluate and treat: fabricate CMC splint MEDICAL/REFERRING DIAGNOSIS:   Unilateral primary osteoarthritis of first carpometacarpal joint, left hand [M18.12]   DATE OF ONSET: several years ago   REFERRING PHYSICIAN: Fernando Aparicio MD  RETURN PHYSICIAN APPOINTMENT: 2017     INITIAL ASSESSMENT:  Ms. Brenda Nieves presents with decreased functional use, strength and range of motion of her left hand and upper extremity that is affecting her independence with activities of daily living and ability to perform job tasks. I feel that Ms. Brenda Nieves will benefit from skilled occupational therapy to maximize the functional use of her hand and upper extremity in daily activities and work tasks. PLAN OF CARE:   PROBLEM LIST:  1. Pain in left hand. 2. Decreased motion in left hand. 3. Decreased strength in left hand. INTERVENTIONS PLANNED:  1. Modalities that may include fluidotherapy, paraffin, ultrasound, and light therapy. 2. Therapeutic exercise including a home exercise program.  3. Manual therapy. 4. Therapeutic activities. TREATMENT PLAN:  Effective Dates: 2017 TO 2017. Frequency/Duration: 2 times a week for 10 weeks  GOALS: (Goals have been discussed and agreed upon with patient.)  Short-Term Functional Goals: Time Frame: 4 weeks  1. Decrease pain to 5 to allow patient to perform self care tasks. ( GOAL MET )  2. Increase motion in left hand by 20 degrees to improve functional use of upper extremity in ADL activities. ( GOAL MET )  3. Increase strength in left hand by 10 pounds to allow patient to  and lift objects during self care activities. ( GOAL MET )  Discharge Goals: Time Frame: 12 weeks  1. Decrease pain to 2 to allow patient to perform all household  Tasks. ( GOAL MET )  2. Increase motion in left hand by 30 degreees to allow patient to perform all ADL activities. ( GOAL MET )  3. Increase strength in left hand by 20 pounds to allow patient to , lift, hold, and carry heavy objects. ( GOAL  MET )  Rehabilitation Potential For Stated Goals: Good  Regarding Mehnaz Rocha's therapy, I certify that the treatment plan above will be carried out by a therapist or under their direction. Thank you for this referral,  Mari Eddy OT       Referring Physician Signature: Fernando Aparicio MD _________________________  Date _________            The information in this section was collected on 2/21/2017 (except where otherwise noted). OCCUPATIONAL PROFILE & HISTORY:   History of Present Injury/Illness (Reason for Referral): The patient has had increasing pain in her left thumb for several years. Past Medical History/Comorbidities:   Ms. Brenda Nieves  has a past medical history of Anemia (2015); Anxiety (5/26/2015); Chronic insomnia (5/26/2015); Chronic pain; Depression (5/26/2015); Genital herpes, unspecified (5/26/2015); Hypertriglyceridemia (5/26/2015); Hypothyroidism (5/26/2015); Osteoarthritis (5/26/2015); Sciatica (5/26/2015); Sinus problem; and Sleep related leg cramps (5/26/2015). Ms. Brenda Nieves  has a past surgical history that includes dilation and curettage (2006) and arthroplasty. Social History/Living Environment:      Prior Level of Function/Work/Activity:  Independent    Current Medications:    Current Outpatient Prescriptions:     calcium-cholecalciferol, d3, (CALCIUM 600 + D) 600-125 mg-unit tab, Take  by mouth daily.  Hold all vitamins for 7 days prior to surgery per anesthesia protocol, Disp: , Rfl:     levothyroxine (SYNTHROID) 88 mcg tablet, Take 1 Tab by mouth Daily (before breakfast). Indications: hypothyroidism, Disp: 90 Tab, Rfl: 4    sertraline (ZOLOFT) 100 mg tablet, Take 1 Tab by mouth daily. Indications: ANXIETY WITH DEPRESSION (Patient taking differently: Take 100 mg by mouth every evening. Indications: ANXIETY WITH DEPRESSION), Disp: 90 Tab, Rfl: 3    alendronate (FOSAMAX) 70 mg tablet, Take 1 Tab by mouth every seven (7) days. , Disp: 12 Tab, Rfl: 3    ALPRAZolam (XANAX) 0.5 mg tablet, Take 1 Tab by mouth nightly as needed for Anxiety. Max Daily Amount: 0.5 mg. (Patient taking differently: Take 0.5 mg by mouth daily as needed for Anxiety.), Disp: 90 Tab, Rfl: 1    acyclovir (ZOVIRAX) 400 mg tablet, Take 1 Tab by mouth two (2) times a day., Disp: 180 Tab, Rfl: 3    cholecalciferol (VITAMIN D3) 1,000 unit cap, Take 1,000 Units by mouth two (2) times a day. Hold all vitamins for 7 days prior to surgery per anesthesia protocol, Disp: , Rfl:     nabumetone (RELAFEN) 500 mg tablet, Take 1 Tab by mouth two (2) times a day. Take with food for arthritis pain  Indications: OSTEOARTHRITIS (Patient taking differently: Take 500 mg by mouth as needed. Hold all vitamins for 7 days prior to surgery per anesthesia protocol  Indications: OSTEOARTHRITIS), Disp: 180 Tab, Rfl: 4    ferrous sulfate (IRON) 325 mg (65 mg iron) tablet, Take 325 mg by mouth Daily (before breakfast). , Disp: , Rfl:    Date Last Reviewed: 4/17/2017    Number of medical conditions (excluding presenting problem) that affect the Plan of Care: Brief history (0):  LOW COMPLEXITY   ASSESSMENT OF OCCUPATIONAL PERFORMANCE:   RANGE OF MOTION:     · AROM:       Left thumb motion is as follows: MP 0/45, IP 0/70, abduction 65, extension 65 degrees. STRENGTH: STRENGTH: Right 55 lbs. Left 30 lbs. LAT PINCH: Right 13 lbs. Left 8 lbs. 3 JAW MONICA: Right 11 lbs. Left 6 lbs. SENSATION: The patient reports slight numbness over her incision. Physical Skills Involved:  1. Range of Motion  2. Strength  3. Fine or Gross Motor Coordination  4. Sensation  5. Dexterity Cognitive Skills Affected (resulting in the inability to perform in a timely and safe manner): 1. none Psychosocial Skills Affected:  1. none   Number of elements that affect the Plan of Care: 1-3:  LOW COMPLEXITY   CLINICAL DECISION MAKING:   Outcome Measure: Tool Used: Disabilities of the Arm, Shoulder and Hand (DASH) Questionnaire - Quick Version  Score:  Initial: 43/55  Most Recent: 14/55 (Date: 4/17/2017 )   Interpretation of Score: The DASH is designed to measure the activities of daily living in person's with upper extremity dysfunction or pain. Each section is scored on a 1-5 scale, 5 representing the greatest disability. The scores of each section are added together for a total score of 55. Score 11 12-19 20-28 29-37 38-45 46-54 55   Modifier CH CI CJ CK CL CM CN     ? Carrying, Moving, and Handling Objects:     - CURRENT STATUS: CI - 1%-19% impaired, limited or restricted    - GOAL STATUS: CJ - 20%-39% impaired, limited or restricted    - D/C STATUS:  CI - 1%-19% impaired, limited or restricted    Medical Necessity:   · Patient is expected to demonstrate progress in strength and range of motion to decrease assistance required with ADL and household activities. .  Reason for Services/Other Comments:  · The patient has limited motion ,strength and function in her left hand. .  Clinical Decision-Making Assessment:     Clinical Decision-Making: LOW COMPLEXITY   TREATMENT:   (In addition to Assessment/Re-Assessment sessions the following treatments were rendered)  Pre-treatment Symptoms/Complaints:    Pain: 0  Pain Intensity 1: 0  Pain Location 1: Hand  Pain Orientation 1: Left  Post Session:  3     The patient was instructed in a home exercise program    Patient stated \"I am able to do most everything now. Teresa Starjelena \"    Manual Therapy: (Soft Tissue Mobilization Duration  Duration: 15 Minutes  Duration: 15 Minutes): Technique: Retrograde massage  Tissue Mobilized: Scar/adhesion  LUE Soft Tissue Mobilization: Yes  Technique: Retrograde massage  Tissue Mobilized: Scar/adhesion   Therapeutic Exercise:                                                                               : The patient's home exercise program was reviewed. Date:  3/21/17 Date:  3/28/17 Date:  4/3/17 Date:  4/13/17 Date:  4/17/17   Activity/Exercise Parameters Parameters Parameters Parameters Parameters   AROM during Fluidotherapy 20 min 20 min 20 min 20 min 20 min   Paraffin with Stretch 15 min   15 min 15 min 15 min 15 min   Retrograde massage, Friction Scar massage, Joint Mobilization   15 min 15 min 15 min 15 min 15 min   Scarf Curl 5 min   5 min 5 min 5 min 5 min   Washer Game 5 min 5 min 5 min 5 min 5 min   Individual Gripper    15 reps 20 reps 20 reps 20 reps   Hand Thomas    15 reps 20 reps 20 reps 20 reps   Cones          Pegs          Clothes Pins    15 reps 20 reps 20 reps 20 reps   A-R Bar          Exerstick    30 reps 40 reps 40 reps 40 reps   Velcro-Roll          AROM EX 10 min       RESISTIVE EXERCISES Weight/ Sets/Reps   Weight/ Sets/Reps Weight/ Sets/Reps Weight/ Sets/Reps Weight/ Sets/Reps   WEIGHT WELL        Sup/Pro        UD/RD        Wrist Flex/Ext        Free Weights       3 lbs. wrist   UBE(Minutes)          Nautilus        Compound Row        Vertical Chest        Overhead Press                    HEP: As above; handouts given to patient for all exercises.     Therapeutic Modalities:         Left Wrist Heat  Type: Paraffin bath (with a thumb flexion stretch)  Duration: 15 minutes  Patient Position: Sitting                                     Joint Mobilization:        Treatment Times:  · Therapeutic Exercise: 30 minutes  · Manual Therapy: 15 minutes  · Parafin: 15 minutes  · Whirlpool:  minutes  · Other:  minutes     Treatment/Session Assessment:    · Response to Treatment:  Patients tolerated treatment well with no complications. Upon completion of treatment, skin condition was normal..  · Compliance with Program/Exercises: Will assess as treatment progresses. · Recommendations/Intent for next treatment session: \"To discharge at this time, all goals met. \"  Total Treatment Duration:  OT Patient Time In/Time Out  Time In: 1030  Time Out: [address] [facility] Rosietto Halsted

## 2017-04-20 ENCOUNTER — APPOINTMENT (OUTPATIENT)
Dept: PHYSICAL THERAPY | Age: 67
End: 2017-04-20
Payer: MEDICARE

## 2017-05-31 NOTE — OP NOTES
Operative Report    DOS:  1/30/2017     Preoperative diagnosis: Primary osteoarthritis of first carpometacarpal joint of left hand [M18.12]     Postoperative diagnosis: Primary osteoarthritis of first carpometacarpal joint of left hand [M18.12]     Surgeon(s) and Role:  * Sylvester Mendez MD - Primary      Anesthesia: Regional Local with MAC.      Procedures: Procedure(s):  LEFT THUMB ARTHROPLASTY / PLEXUS   Left thumb APL ligament reconstruction  Left thumb MP joint volar capsulodesis utilizing Mitech suture anchor     EBL/IV FLUIDS: Per Anesthesia.      COMPLICATIONS: None.      DISPOSITION: Stable to recovery room.      INDICATIONS FOR PROCEDURE: The patient is a pleasant 43-year-old Female with history of left Thumb arthritis and MP joint hyperextensionthat has failed nonoperative measures. After both operative and nonoperative treatment options were discussed, the decision was made to go ahead with a Left thumb arthroplasty, left thumb APL ligament reconstruction, possible left thumb volar MP joint capsulodesis. Risks and benefits of the procedure were discussed including, but not limited to bleeding, infection, injury to adjacent structures consisting of tendon, artery, and nerve, need for additional procedures, wound dehiscence, scar formation, incomplete resolution of symptoms, Thumb CMC subsidence, possible need for MP joint fusion which she did not want to perform today,recurrence of symptoms, and transient neuropraxia. Informed consent was obtained.      PROCEDURE IN DETAIL: The patient was seen and marked in the preoperative suite. The patient was taken back to the OR, placed on the table in supine position with Left upper extremities on hand tables. Left upper extremities were prepped and draped in standard sterile fashion.  A formal timeout was performed confirming patient identification, preoperative antibiotics, and planned operative procedure.     We turned our attention to the first portion of the procedure the thumb arthroplasty. A standard incision was made over the [facility] joint. We circumferentially dissected out the [facility] joint specifically the trapezium we had to excise the trapezium in piecemeal manner since it was so degenerated and then. The trapezium was excised in its entirety we irrigated copiously with normal saline. We checked the STT joint there was not significant arthritis so we did not have to perform a partial trapezoid ectomy.     Next under separate procedure we performed the APL ligament reconstruction. We harvested the APL in standard fashion making secondary incision proximally leaving it distally attached we drilled through the proximal metacarpal of the thumb passing the tendon and then through the proximal index metacarpal passing the tendon and tying it back via a Pulvertaft weave Into the ECRB. We then pinned the thumb metacarpal to prevent subsidence.     After we got her out to length we assessed her MP hyperextension was not acceptable. We then turned our attention to the third portion of the procedure. Next under separate incision we performed a mid axial incision to the radial side of the MP joint we protected the digital nerve and flexor tendons we placed the Mytec suture anchor proximal to the volar capsule releasing the proximal edge of the volar capsule and then tying it back in a tighter fashion utilizing the anchor. This tightened up her volar capsule well performing a capsulodesis.     We irrigated all incisions we closed with Monocryl subcutaneously on some and a running subcuticular Prolene and Dermabond glue.  Soft sterile dressing was placed the pin was cut well padded she was placed into a thumb spica splint the tourniquet was let down and the patient was taken to the recovery room having tolerated procedure well.     Postoperative care and follow-up in 2 weeks for suture removal transition to a removable thumb spica splint.     Closure: Primary     Complications: None

## 2017-07-20 PROBLEM — G89.29 CHRONIC RIGHT LOWER QUADRANT PAIN: Status: ACTIVE | Noted: 2017-07-20

## 2017-07-20 PROBLEM — R10.31 CHRONIC RIGHT LOWER QUADRANT PAIN: Status: ACTIVE | Noted: 2017-07-20

## 2018-08-02 PROBLEM — F10.10 EXCESSIVE DRINKING ALCOHOL: Status: ACTIVE | Noted: 2018-08-02

## 2018-08-02 PROBLEM — F32.0 MILD SINGLE CURRENT EPISODE OF MAJOR DEPRESSIVE DISORDER (HCC): Status: ACTIVE | Noted: 2018-08-02

## 2018-08-02 PROBLEM — J68.3 REACTIVE AIRWAYS DYSFUNCTION SYNDROME, MILD INTERMITTENT, UNCOMPLICATED (HCC): Status: ACTIVE | Noted: 2018-08-02

## 2018-08-07 PROBLEM — E03.4 HYPOTHYROIDISM DUE TO ACQUIRED ATROPHY OF THYROID: Status: ACTIVE | Noted: 2018-08-07

## 2018-08-07 PROBLEM — F43.9 STRESS AT HOME: Status: ACTIVE | Noted: 2018-08-07

## 2018-11-07 PROBLEM — Z12.39 BREAST CANCER SCREENING: Status: ACTIVE | Noted: 2018-11-07

## 2018-11-13 ENCOUNTER — HOSPITAL ENCOUNTER (OUTPATIENT)
Dept: MAMMOGRAPHY | Age: 68
Discharge: HOME | End: 2018-11-13
Attending: INTERNAL MEDICINE
Payer: MEDICARE

## 2018-11-13 DIAGNOSIS — Z12.39 BREAST CANCER SCREENING: ICD-10-CM

## 2018-11-13 PROCEDURE — 77067 SCR MAMMO BI INCL CAD: CPT

## 2019-07-31 PROBLEM — F43.21 GRIEF REACTION: Status: ACTIVE | Noted: 2019-07-31

## 2019-07-31 PROBLEM — E03.4 HYPOTHYROIDISM DUE TO ACQUIRED ATROPHY OF THYROID: Status: ACTIVE | Noted: 2019-07-31

## 2019-09-23 PROBLEM — Z12.39 BREAST CANCER SCREENING: Status: RESOLVED | Noted: 2018-11-07 | Resolved: 2019-09-23

## 2019-12-26 PROBLEM — G47.9 SLEEP DISORDER: Status: ACTIVE | Noted: 2019-12-26

## 2020-01-24 ENCOUNTER — HOSPITAL ENCOUNTER (OUTPATIENT)
Dept: MAMMOGRAPHY | Age: 70
Discharge: HOME | End: 2020-01-24
Attending: INTERNAL MEDICINE
Payer: MEDICARE

## 2020-01-24 DIAGNOSIS — Z12.31 VISIT FOR SCREENING MAMMOGRAM: ICD-10-CM

## 2020-01-24 PROCEDURE — 77067 SCR MAMMO BI INCL CAD: CPT

## 2020-03-27 PROBLEM — L65.9 ALOPECIA: Status: ACTIVE | Noted: 2020-03-27

## 2020-03-27 PROBLEM — D17.23 LIPOMA OF RIGHT LOWER EXTREMITY: Status: ACTIVE | Noted: 2020-03-27

## 2020-10-09 PROBLEM — M71.22 SYNOVIAL CYST OF LEFT KNEE: Status: ACTIVE | Noted: 2020-10-09

## 2020-10-09 PROBLEM — R39.15 URINARY URGENCY: Status: ACTIVE | Noted: 2020-10-09

## 2020-10-09 PROBLEM — Z23 ENCOUNTER FOR IMMUNIZATION: Status: ACTIVE | Noted: 2018-11-07

## 2020-10-09 PROBLEM — R19.7 DIARRHEA: Status: ACTIVE | Noted: 2020-10-09

## 2020-10-09 PROBLEM — R63.4 WEIGHT LOSS: Status: ACTIVE | Noted: 2020-10-09

## 2020-11-08 PROBLEM — Z23 ENCOUNTER FOR IMMUNIZATION: Status: RESOLVED | Noted: 2018-11-07 | Resolved: 2020-11-08

## 2021-04-14 PROBLEM — S22.22XD CLOSED FRACTURE OF BODY OF STERNUM WITH ROUTINE HEALING: Status: ACTIVE | Noted: 2017-11-30

## 2021-04-21 PROBLEM — J30.89 NON-SEASONAL ALLERGIC RHINITIS: Status: ACTIVE | Noted: 2021-04-21

## 2021-05-28 ENCOUNTER — HOSPITAL ENCOUNTER (OUTPATIENT)
Dept: MAMMOGRAPHY | Age: 71
Discharge: HOME | End: 2021-05-28
Attending: INTERNAL MEDICINE
Payer: MEDICARE

## 2021-05-28 ENCOUNTER — HOSPITAL ENCOUNTER (OUTPATIENT)
Dept: MAMMOGRAPHY | Age: 71
Discharge: HOME | End: 2021-05-28
Attending: NURSE PRACTITIONER
Payer: MEDICARE

## 2021-05-28 DIAGNOSIS — Z12.31 ENCOUNTER FOR SCREENING MAMMOGRAM FOR MALIGNANT NEOPLASM OF BREAST: ICD-10-CM

## 2021-05-28 DIAGNOSIS — M81.0 OSTEOPOROSIS, UNSPECIFIED OSTEOPOROSIS TYPE, UNSPECIFIED PATHOLOGICAL FRACTURE PRESENCE: ICD-10-CM

## 2021-05-28 PROCEDURE — 77080 DXA BONE DENSITY AXIAL: CPT

## 2021-05-28 PROCEDURE — 77067 SCR MAMMO BI INCL CAD: CPT

## 2021-06-29 NOTE — PROGRESS NOTES
Left message to call office on mobile phone. Also called home phone, no answer and voicemail not set up.

## 2021-08-24 PROBLEM — E55.9 VITAMIN D DEFICIENCY: Status: ACTIVE | Noted: 2021-08-24

## 2021-08-24 PROBLEM — Z12.11 COLON CANCER SCREENING: Status: ACTIVE | Noted: 2021-08-24

## 2021-08-24 PROBLEM — M79.644 FINGER PAIN, RIGHT: Status: ACTIVE | Noted: 2021-08-24

## 2021-09-23 PROBLEM — Z12.11 COLON CANCER SCREENING: Status: RESOLVED | Noted: 2021-08-24 | Resolved: 2021-09-23

## 2021-10-22 ENCOUNTER — HOSPITAL ENCOUNTER (OUTPATIENT)
Dept: INFUSION THERAPY | Age: 71
Discharge: HOME | End: 2021-10-22
Payer: MEDICARE

## 2021-10-22 ENCOUNTER — HOSPITAL ENCOUNTER (OUTPATIENT)
Dept: LAB | Age: 71
Discharge: HOME | End: 2021-10-22

## 2021-10-22 VITALS
SYSTOLIC BLOOD PRESSURE: 155 MMHG | DIASTOLIC BLOOD PRESSURE: 67 MMHG | RESPIRATION RATE: 18 BRPM | HEART RATE: 63 BPM | TEMPERATURE: 98 F

## 2021-10-22 LAB
CALCIUM SERPL-MCNC: 9.4 MG/DL (ref 8.3–10.4)
CREAT SERPL-MCNC: 1.1 MG/DL (ref 0.6–1)

## 2021-10-22 PROCEDURE — 74011250636 HC RX REV CODE- 250/636: Performed by: INTERNAL MEDICINE

## 2021-10-22 PROCEDURE — 36415 COLL VENOUS BLD VENIPUNCTURE: CPT

## 2021-10-22 PROCEDURE — 82565 ASSAY OF CREATININE: CPT

## 2021-10-22 PROCEDURE — 82310 ASSAY OF CALCIUM: CPT

## 2021-10-22 PROCEDURE — 96372 THER/PROPH/DIAG INJ SC/IM: CPT

## 2021-10-22 RX ADMIN — DENOSUMAB 60 MG: 60 INJECTION SUBCUTANEOUS at 11:01

## 2021-10-22 NOTE — PROGRESS NOTES
Arrived to the [facility]. Prolia completed and tolerated well. Any issues or concerns during appointment: denies  Patient given education on prolia injection   Informed of next infusion appointment scheduled for 4/26/22 at 1030am.  Instructed patient to notify provider for any issues or worrisome symptoms. They verbalized understanding. Discharged ambulatory.

## 2022-03-18 PROBLEM — J30.89 NON-SEASONAL ALLERGIC RHINITIS: Status: ACTIVE | Noted: 2021-04-21

## 2022-03-18 PROBLEM — L65.9 ALOPECIA: Status: ACTIVE | Noted: 2020-03-27

## 2022-03-18 PROBLEM — S22.22XD CLOSED FRACTURE OF BODY OF STERNUM WITH ROUTINE HEALING: Status: ACTIVE | Noted: 2017-11-30

## 2022-03-19 PROBLEM — E03.4 HYPOTHYROIDISM DUE TO ACQUIRED ATROPHY OF THYROID: Status: ACTIVE | Noted: 2019-07-31

## 2022-03-19 PROBLEM — F32.0 MILD SINGLE CURRENT EPISODE OF MAJOR DEPRESSIVE DISORDER (HCC): Status: ACTIVE | Noted: 2018-08-02

## 2022-03-19 PROBLEM — R63.4 WEIGHT LOSS: Status: ACTIVE | Noted: 2020-10-09

## 2022-03-19 PROBLEM — F10.10 EXCESSIVE DRINKING ALCOHOL: Status: ACTIVE | Noted: 2018-08-02

## 2022-03-19 PROBLEM — J68.3 REACTIVE AIRWAYS DYSFUNCTION SYNDROME, MILD INTERMITTENT, UNCOMPLICATED (HCC): Status: ACTIVE | Noted: 2018-08-02

## 2022-03-19 PROBLEM — R39.15 URINARY URGENCY: Status: ACTIVE | Noted: 2020-10-09

## 2022-03-19 PROBLEM — M79.644 FINGER PAIN, RIGHT: Status: ACTIVE | Noted: 2021-08-24

## 2022-03-19 PROBLEM — E55.9 VITAMIN D DEFICIENCY: Status: ACTIVE | Noted: 2021-08-24

## 2022-03-20 PROBLEM — F43.21 GRIEF: Status: ACTIVE | Noted: 2019-07-31

## 2022-03-20 PROBLEM — R19.7 DIARRHEA: Status: ACTIVE | Noted: 2020-10-09

## 2022-03-20 PROBLEM — D17.23 LIPOMA OF RIGHT LOWER EXTREMITY: Status: ACTIVE | Noted: 2020-03-27

## 2022-03-20 PROBLEM — G47.9 SLEEP DISORDER: Status: ACTIVE | Noted: 2019-12-26

## 2022-03-20 PROBLEM — M71.22 SYNOVIAL CYST OF LEFT KNEE: Status: ACTIVE | Noted: 2020-10-09

## 2022-04-18 ENCOUNTER — HOSPITAL ENCOUNTER (OUTPATIENT)
Dept: LAB | Age: 72
Discharge: HOME | End: 2022-04-18
Payer: MEDICARE

## 2022-04-18 DIAGNOSIS — E55.9 VITAMIN D DEFICIENCY: ICD-10-CM

## 2022-04-18 DIAGNOSIS — E78.1 HYPERTRIGLYCERIDEMIA: ICD-10-CM

## 2022-04-18 DIAGNOSIS — E03.9 ACQUIRED HYPOTHYROIDISM: ICD-10-CM

## 2022-04-18 LAB
25(OH)D3 SERPL-MCNC: 50.8 NG/ML (ref 30–100)
ALBUMIN SERPL-MCNC: 3.6 G/DL (ref 3.2–4.6)
ALBUMIN/GLOB SERPL: 1.2 {RATIO} (ref 1.2–3.5)
ALP SERPL-CCNC: 74 U/L (ref 50–136)
ALT SERPL-CCNC: 18 U/L (ref 12–65)
ANION GAP SERPL CALC-SCNC: 5 MMOL/L (ref 7–16)
APPEARANCE UR: CLEAR
AST SERPL-CCNC: 16 U/L (ref 15–37)
BILIRUB SERPL-MCNC: 0.5 MG/DL (ref 0.2–1.1)
BILIRUB UR QL: NEGATIVE
BUN SERPL-MCNC: 12 MG/DL (ref 8–23)
CALCIUM SERPL-MCNC: 8.8 MG/DL (ref 8.3–10.4)
CHLORIDE SERPL-SCNC: 105 MMOL/L (ref 98–107)
CHOLEST SERPL-MCNC: 193 MG/DL
CO2 SERPL-SCNC: 30 MMOL/L (ref 21–32)
COLOR UR: NORMAL
CREAT SERPL-MCNC: 0.9 MG/DL (ref 0.6–1)
GLOBULIN SER CALC-MCNC: 2.9 G/DL (ref 2.3–3.5)
GLUCOSE SERPL-MCNC: 101 MG/DL (ref 65–100)
GLUCOSE UR STRIP.AUTO-MCNC: NEGATIVE MG/DL
HDLC SERPL-MCNC: 62 MG/DL (ref 40–60)
HDLC SERPL: 3.1 {RATIO}
HGB UR QL STRIP: NEGATIVE
KETONES UR QL STRIP.AUTO: NEGATIVE MG/DL
LDLC SERPL CALC-MCNC: 104 MG/DL
LEUKOCYTE ESTERASE UR QL STRIP.AUTO: NEGATIVE
NITRITE UR QL STRIP.AUTO: NEGATIVE
PH UR STRIP: 6 [PH] (ref 5–9)
POTASSIUM SERPL-SCNC: 4.1 MMOL/L (ref 3.5–5.1)
PROT SERPL-MCNC: 6.5 G/DL (ref 6.3–8.2)
PROT UR STRIP-MCNC: NEGATIVE MG/DL
SODIUM SERPL-SCNC: 140 MMOL/L (ref 136–145)
SP GR UR REFRACTOMETRY: 1.01 (ref 1–1.02)
TRIGL SERPL-MCNC: 135 MG/DL (ref 35–150)
TSH SERPL DL<=0.005 MIU/L-ACNC: 2.23 UIU/ML (ref 0.36–3.74)
UROBILINOGEN UR QL STRIP.AUTO: 0.2 EU/DL (ref 0.2–1)
VLDLC SERPL CALC-MCNC: 27 MG/DL (ref 6–23)

## 2022-04-18 PROCEDURE — 82306 VITAMIN D 25 HYDROXY: CPT

## 2022-04-18 PROCEDURE — 80061 LIPID PANEL: CPT

## 2022-04-18 PROCEDURE — 36415 COLL VENOUS BLD VENIPUNCTURE: CPT

## 2022-04-18 PROCEDURE — 81003 URINALYSIS AUTO W/O SCOPE: CPT

## 2022-04-18 PROCEDURE — 80053 COMPREHEN METABOLIC PANEL: CPT

## 2022-04-18 PROCEDURE — 84443 ASSAY THYROID STIM HORMONE: CPT

## 2022-04-24 PROBLEM — E03.4 HYPOTHYROIDISM DUE TO ACQUIRED ATROPHY OF THYROID: Status: RESOLVED | Noted: 2019-07-31 | Resolved: 2022-04-24

## 2022-04-25 ENCOUNTER — HOSPITAL ENCOUNTER (OUTPATIENT)
Dept: GENERAL RADIOLOGY | Age: 72
Discharge: HOME | End: 2022-04-25

## 2022-04-25 DIAGNOSIS — G89.29 CHRONIC PAIN OF RIGHT KNEE: ICD-10-CM

## 2022-04-25 DIAGNOSIS — M25.561 CHRONIC PAIN OF RIGHT KNEE: ICD-10-CM

## 2022-04-27 NOTE — PROGRESS NOTES
Pt notified of normal xray results per Dr. Gertrudis Young. Pt said she would like the referral to ortho since she has had the pain for a while now. Referral placed.

## 2023-03-15 RX ORDER — SERTRALINE HYDROCHLORIDE 100 MG/1
TABLET, FILM COATED ORAL
Qty: 135 TABLET | OUTPATIENT
Start: 2023-03-15

## 2023-03-15 RX ORDER — ACYCLOVIR 400 MG/1
TABLET ORAL
Qty: 180 TABLET | OUTPATIENT
Start: 2023-03-15

## 2023-03-15 RX ORDER — BUPROPION HYDROCHLORIDE 150 MG/1
TABLET ORAL
Qty: 90 TABLET | OUTPATIENT
Start: 2023-03-15

## 2023-03-24 ENCOUNTER — TRANSCRIBE ORDERS (OUTPATIENT)
Dept: SCHEDULING | Age: 73
End: 2023-03-24

## 2023-03-24 DIAGNOSIS — Z12.31 VISIT FOR SCREENING MAMMOGRAM: Primary | ICD-10-CM

## 2023-03-27 ENCOUNTER — TELEPHONE (OUTPATIENT)
Dept: INTERNAL MEDICINE CLINIC | Facility: CLINIC | Age: 73
End: 2023-03-27

## 2023-03-31 RX ORDER — SERTRALINE HYDROCHLORIDE 100 MG/1
TABLET, FILM COATED ORAL
Qty: 135 TABLET | OUTPATIENT
Start: 2023-03-31

## 2023-03-31 RX ORDER — BUPROPION HYDROCHLORIDE 150 MG/1
TABLET ORAL
Qty: 90 TABLET | OUTPATIENT
Start: 2023-03-31

## 2023-03-31 RX ORDER — ACYCLOVIR 400 MG/1
TABLET ORAL
Qty: 180 TABLET | OUTPATIENT
Start: 2023-03-31

## 2023-05-19 ENCOUNTER — HOSPITAL ENCOUNTER (OUTPATIENT)
Dept: MAMMOGRAPHY | Age: 73
End: 2023-05-19
Payer: MEDICARE

## 2023-05-19 DIAGNOSIS — Z12.31 VISIT FOR SCREENING MAMMOGRAM: ICD-10-CM

## 2023-05-19 PROCEDURE — 77067 SCR MAMMO BI INCL CAD: CPT

## 2023-05-31 RX ORDER — LEVOTHYROXINE SODIUM 0.07 MG/1
TABLET ORAL
Qty: 90 TABLET | OUTPATIENT
Start: 2023-05-31

## 2023-09-14 ENCOUNTER — OFFICE VISIT (OUTPATIENT)
Dept: INTERNAL MEDICINE CLINIC | Facility: CLINIC | Age: 73
End: 2023-09-14
Payer: MEDICARE

## 2023-09-14 VITALS
WEIGHT: 133 LBS | DIASTOLIC BLOOD PRESSURE: 58 MMHG | HEART RATE: 79 BPM | SYSTOLIC BLOOD PRESSURE: 164 MMHG | OXYGEN SATURATION: 94 % | HEIGHT: 65 IN | BODY MASS INDEX: 22.16 KG/M2

## 2023-09-14 DIAGNOSIS — M80.00XD AGE-RELATED OSTEOPOROSIS WITH CURRENT PATHOLOGICAL FRACTURE WITH ROUTINE HEALING, SUBSEQUENT ENCOUNTER: ICD-10-CM

## 2023-09-14 DIAGNOSIS — Z87.81 S/P LEFT HIP FRACTURE: ICD-10-CM

## 2023-09-14 DIAGNOSIS — I10 UNCONTROLLED HYPERTENSION: ICD-10-CM

## 2023-09-14 DIAGNOSIS — E03.9 ACQUIRED HYPOTHYROIDISM: ICD-10-CM

## 2023-09-14 DIAGNOSIS — Z91.81 AT HIGH RISK FOR FALLS: ICD-10-CM

## 2023-09-14 DIAGNOSIS — D64.9 ANEMIA, UNSPECIFIED TYPE: ICD-10-CM

## 2023-09-14 DIAGNOSIS — I10 UNCONTROLLED HYPERTENSION: Primary | ICD-10-CM

## 2023-09-14 PROBLEM — J68.3 REACTIVE AIRWAYS DYSFUNCTION SYNDROME, MILD INTERMITTENT, UNCOMPLICATED (HCC): Status: RESOLVED | Noted: 2018-08-02 | Resolved: 2023-09-14

## 2023-09-14 LAB
ANION GAP SERPL CALC-SCNC: 6 MMOL/L (ref 2–11)
BASOPHILS # BLD: 0.1 K/UL (ref 0–0.2)
BASOPHILS NFR BLD: 1 % (ref 0–2)
BUN SERPL-MCNC: 18 MG/DL (ref 8–23)
CALCIUM SERPL-MCNC: 9.1 MG/DL (ref 8.3–10.4)
CHLORIDE SERPL-SCNC: 108 MMOL/L (ref 101–110)
CO2 SERPL-SCNC: 27 MMOL/L (ref 21–32)
CREAT SERPL-MCNC: 0.8 MG/DL (ref 0.6–1)
DIFFERENTIAL METHOD BLD: ABNORMAL
EOSINOPHIL # BLD: 0.3 K/UL (ref 0–0.8)
EOSINOPHIL NFR BLD: 4 % (ref 0.5–7.8)
ERYTHROCYTE [DISTWIDTH] IN BLOOD BY AUTOMATED COUNT: 16 % (ref 11.9–14.6)
FERRITIN SERPL-MCNC: 216 NG/ML (ref 8–388)
GLUCOSE SERPL-MCNC: 99 MG/DL (ref 65–100)
HCT VFR BLD AUTO: 31.9 % (ref 35.8–46.3)
HGB BLD-MCNC: 9.6 G/DL (ref 11.7–15.4)
IMM GRANULOCYTES # BLD AUTO: 0 K/UL (ref 0–0.5)
IMM GRANULOCYTES NFR BLD AUTO: 0 % (ref 0–5)
LYMPHOCYTES # BLD: 1.3 K/UL (ref 0.5–4.6)
LYMPHOCYTES NFR BLD: 19 % (ref 13–44)
MCH RBC QN AUTO: 30.3 PG (ref 26.1–32.9)
MCHC RBC AUTO-ENTMCNC: 30.1 G/DL (ref 31.4–35)
MCV RBC AUTO: 100.6 FL (ref 82–102)
MONOCYTES # BLD: 0.6 K/UL (ref 0.1–1.3)
MONOCYTES NFR BLD: 9 % (ref 4–12)
NEUTS SEG # BLD: 4.8 K/UL (ref 1.7–8.2)
NEUTS SEG NFR BLD: 67 % (ref 43–78)
NRBC # BLD: 0 K/UL (ref 0–0.2)
PLATELET # BLD AUTO: 331 K/UL (ref 150–450)
PMV BLD AUTO: 10.7 FL (ref 9.4–12.3)
POTASSIUM SERPL-SCNC: 3.6 MMOL/L (ref 3.5–5.1)
RBC # BLD AUTO: 3.17 M/UL (ref 4.05–5.2)
SODIUM SERPL-SCNC: 141 MMOL/L (ref 133–143)
WBC # BLD AUTO: 7.1 K/UL (ref 4.3–11.1)

## 2023-09-14 PROCEDURE — 3017F COLORECTAL CA SCREEN DOC REV: CPT | Performed by: NURSE PRACTITIONER

## 2023-09-14 PROCEDURE — 99214 OFFICE O/P EST MOD 30 MIN: CPT | Performed by: NURSE PRACTITIONER

## 2023-09-14 PROCEDURE — 3077F SYST BP >= 140 MM HG: CPT | Performed by: NURSE PRACTITIONER

## 2023-09-14 PROCEDURE — 1123F ACP DISCUSS/DSCN MKR DOCD: CPT | Performed by: NURSE PRACTITIONER

## 2023-09-14 PROCEDURE — G8399 PT W/DXA RESULTS DOCUMENT: HCPCS | Performed by: NURSE PRACTITIONER

## 2023-09-14 PROCEDURE — 3078F DIAST BP <80 MM HG: CPT | Performed by: NURSE PRACTITIONER

## 2023-09-14 PROCEDURE — G8420 CALC BMI NORM PARAMETERS: HCPCS | Performed by: NURSE PRACTITIONER

## 2023-09-14 PROCEDURE — 1036F TOBACCO NON-USER: CPT | Performed by: NURSE PRACTITIONER

## 2023-09-14 PROCEDURE — 1090F PRES/ABSN URINE INCON ASSESS: CPT | Performed by: NURSE PRACTITIONER

## 2023-09-14 PROCEDURE — G8427 DOCREV CUR MEDS BY ELIG CLIN: HCPCS | Performed by: NURSE PRACTITIONER

## 2023-09-14 RX ORDER — LEVOTHYROXINE SODIUM 0.07 MG/1
75 TABLET ORAL
Qty: 90 TABLET | Refills: 3 | Status: SHIPPED | OUTPATIENT
Start: 2023-09-14

## 2023-09-14 RX ORDER — ASPIRIN 81 MG/1
81 TABLET, CHEWABLE ORAL 2 TIMES DAILY
Qty: 60 TABLET | Refills: 0 | COMMUNITY
Start: 2023-08-28 | End: 2023-09-27

## 2023-09-14 RX ORDER — ACETAMINOPHEN 325 MG/1
352-650 TABLET ORAL EVERY 6 HOURS PRN
COMMUNITY

## 2023-09-14 RX ORDER — CALCIUM CARBONATE 500(1250)
500 TABLET ORAL DAILY
COMMUNITY

## 2023-09-14 RX ORDER — LISINOPRIL 2.5 MG/1
2.5 TABLET ORAL DAILY
Qty: 30 TABLET | Refills: 3 | Status: SHIPPED | OUTPATIENT
Start: 2023-09-14

## 2023-09-14 SDOH — ECONOMIC STABILITY: FOOD INSECURITY: WITHIN THE PAST 12 MONTHS, YOU WORRIED THAT YOUR FOOD WOULD RUN OUT BEFORE YOU GOT MONEY TO BUY MORE.: NEVER TRUE

## 2023-09-14 SDOH — ECONOMIC STABILITY: HOUSING INSECURITY
IN THE LAST 12 MONTHS, WAS THERE A TIME WHEN YOU DID NOT HAVE A STEADY PLACE TO SLEEP OR SLEPT IN A SHELTER (INCLUDING NOW)?: NO

## 2023-09-14 SDOH — ECONOMIC STABILITY: FOOD INSECURITY: WITHIN THE PAST 12 MONTHS, THE FOOD YOU BOUGHT JUST DIDN'T LAST AND YOU DIDN'T HAVE MONEY TO GET MORE.: NEVER TRUE

## 2023-09-14 SDOH — ECONOMIC STABILITY: INCOME INSECURITY: HOW HARD IS IT FOR YOU TO PAY FOR THE VERY BASICS LIKE FOOD, HOUSING, MEDICAL CARE, AND HEATING?: NOT HARD AT ALL

## 2023-09-14 ASSESSMENT — PATIENT HEALTH QUESTIONNAIRE - PHQ9
8. MOVING OR SPEAKING SO SLOWLY THAT OTHER PEOPLE COULD HAVE NOTICED. OR THE OPPOSITE, BEING SO FIGETY OR RESTLESS THAT YOU HAVE BEEN MOVING AROUND A LOT MORE THAN USUAL: 0
4. FEELING TIRED OR HAVING LITTLE ENERGY: 0
6. FEELING BAD ABOUT YOURSELF - OR THAT YOU ARE A FAILURE OR HAVE LET YOURSELF OR YOUR FAMILY DOWN: 0
5. POOR APPETITE OR OVEREATING: 0
SUM OF ALL RESPONSES TO PHQ9 QUESTIONS 1 & 2: 0
SUM OF ALL RESPONSES TO PHQ QUESTIONS 1-9: 0
3. TROUBLE FALLING OR STAYING ASLEEP: 0
SUM OF ALL RESPONSES TO PHQ QUESTIONS 1-9: 0
2. FEELING DOWN, DEPRESSED OR HOPELESS: 0
9. THOUGHTS THAT YOU WOULD BE BETTER OFF DEAD, OR OF HURTING YOURSELF: 0
10. IF YOU CHECKED OFF ANY PROBLEMS, HOW DIFFICULT HAVE THESE PROBLEMS MADE IT FOR YOU TO DO YOUR WORK, TAKE CARE OF THINGS AT HOME, OR GET ALONG WITH OTHER PEOPLE: 0
1. LITTLE INTEREST OR PLEASURE IN DOING THINGS: 0
SUM OF ALL RESPONSES TO PHQ QUESTIONS 1-9: 0
7. TROUBLE CONCENTRATING ON THINGS, SUCH AS READING THE NEWSPAPER OR WATCHING TELEVISION: 0
SUM OF ALL RESPONSES TO PHQ QUESTIONS 1-9: 0

## 2023-09-14 ASSESSMENT — ENCOUNTER SYMPTOMS: SHORTNESS OF BREATH: 0

## 2023-09-14 NOTE — PROGRESS NOTES
PROGRESS NOTE      Chief Complaint   Patient presents with    Hypertension     Pt here for f/u     Medication Refill       HPI    Hypertension: Remote history of medical management but no meds for years. Home health and PT in home checked blood pressure and patient was told that bp was high and she should see her PCP. Recent hip fracture. Minimal pain. No NSAIDs. Had old monitor at home and readings this week mostly 130s-140s/70s    Left hip fracture: [place] in yoga class. Surgery at [facility] 8/23. Ambulating with walker. Home PT. Minimal pain. Osteoporosis: Prescribed Prolia by Dr Hari Ramirez but only had one dose over 2 years ago. Scheduled for DEXA over at [facility] and follow up with JOSE Garner to discuss bone health next month    Iron deficiency anemia: Blood loss during surgery. Taking iron supplement. Hemoglobin        Past Medical History, Past Surgical History, Family history, Social History, and Medications were all reviewed and updated as necessary.      Current Outpatient Medications   Medication Sig Dispense Refill    Multiple Vitamins-Minerals (MULTIVITAMIN ADULTS PO) Take 1 tablet by mouth daily      aspirin 81 MG chewable tablet Take 1 tablet by mouth 2 times daily 60 tablet 0    acetaminophen (TYLENOL) 325 MG tablet Take 352-650 mg by mouth every 6 hours as needed for Pain      calcium carbonate (OSCAL) 500 MG TABS tablet Take 1 tablet by mouth daily      levothyroxine (SYNTHROID) 75 MCG tablet Take 1 tablet by mouth every morning (before breakfast) 90 tablet 3    lisinopril (ZESTRIL) 2.5 MG tablet Take 1 tablet by mouth daily 30 tablet 3    acyclovir (ZOVIRAX) 400 MG tablet Take 1 tablet by mouth 2 times daily      buPROPion (WELLBUTRIN XL) 150 MG extended release tablet TAKE 1 TABLET BY MOUTH ONCE DAILY IN THE MORNING      Cholecalciferol 50 MCG (2000 UT) CAPS Take 1 capsule by mouth 2 times daily      denosumab (PROLIA) 60 MG/ML SOSY SC injection Inject 1 mL into the skin      LORazepam (ATIVAN) 0.5 MG tablet

## 2023-09-15 ENCOUNTER — TELEPHONE (OUTPATIENT)
Dept: INTERNAL MEDICINE CLINIC | Facility: CLINIC | Age: 73
End: 2023-09-15

## 2023-09-15 NOTE — TELEPHONE ENCOUNTER
----- Message from GABE Orozco CNP sent at 9/15/2023 12:01 AM EDT -----  Regarding: FW:  Can let her know hemoglobin looks good compared with hospital readings.  Continue iron supplement and follow up with dr Heidi Castellon as scheduled  ----- Message -----  From: Umer Cali Incoming [place] [employer]  Sent: 9/14/2023   7:06 PM EDT  To: GABE Orozco CNP Unable to contact patient via phone.    Portal message send to patient with message and results sent out in the mail

## 2023-11-07 ENCOUNTER — HOSPITAL ENCOUNTER (OUTPATIENT)
Dept: MAMMOGRAPHY | Age: 73
Discharge: HOME | End: 2023-11-10
Payer: MEDICARE

## 2023-11-07 DIAGNOSIS — M81.0 OSTEOPOROSIS, UNSPECIFIED OSTEOPOROSIS TYPE, UNSPECIFIED PATHOLOGICAL FRACTURE PRESENCE: ICD-10-CM

## 2023-11-07 PROCEDURE — 77080 DXA BONE DENSITY AXIAL: CPT

## 2023-11-17 ENCOUNTER — OFFICE VISIT (OUTPATIENT)
Dept: INTERNAL MEDICINE CLINIC | Facility: CLINIC | Age: 73
End: 2023-11-17

## 2023-11-17 VITALS
SYSTOLIC BLOOD PRESSURE: 110 MMHG | BODY MASS INDEX: 19.49 KG/M2 | OXYGEN SATURATION: 97 % | WEIGHT: 117 LBS | HEIGHT: 65 IN | HEART RATE: 67 BPM | DIASTOLIC BLOOD PRESSURE: 62 MMHG

## 2023-11-17 DIAGNOSIS — D69.6 THROMBOCYTOPENIA (HCC): ICD-10-CM

## 2023-11-17 DIAGNOSIS — E03.9 ACQUIRED HYPOTHYROIDISM: ICD-10-CM

## 2023-11-17 DIAGNOSIS — I10 PRIMARY HYPERTENSION: ICD-10-CM

## 2023-11-17 DIAGNOSIS — Z00.00 MEDICARE ANNUAL WELLNESS VISIT, SUBSEQUENT: Primary | ICD-10-CM

## 2023-11-17 DIAGNOSIS — E78.1 HYPERTRIGLYCERIDEMIA: ICD-10-CM

## 2023-11-17 DIAGNOSIS — F32.0 MAJOR DEPRESSIVE DISORDER, SINGLE EPISODE, MILD (HCC): ICD-10-CM

## 2023-11-17 DIAGNOSIS — Z23 NEED FOR INFLUENZA VACCINATION: ICD-10-CM

## 2023-11-17 DIAGNOSIS — Z12.11 SCREENING FOR MALIGNANT NEOPLASM OF COLON: ICD-10-CM

## 2023-11-17 DIAGNOSIS — E55.9 VITAMIN D DEFICIENCY: ICD-10-CM

## 2023-11-17 DIAGNOSIS — M81.0 AGE-RELATED OSTEOPOROSIS WITHOUT CURRENT PATHOLOGICAL FRACTURE: ICD-10-CM

## 2023-11-17 PROBLEM — R53.1 GENERALIZED WEAKNESS: Status: ACTIVE | Noted: 2023-08-24

## 2023-11-17 PROBLEM — M25.552 ACUTE HIP PAIN, LEFT: Status: ACTIVE | Noted: 2022-04-25

## 2023-11-17 PROBLEM — Z87.81 HISTORY OF FRACTURE OF LEFT HIP: Status: ACTIVE | Noted: 2023-08-23

## 2023-11-17 PROBLEM — M25.552 ACUTE HIP PAIN, LEFT: Status: RESOLVED | Noted: 2022-04-25 | Resolved: 2023-11-17

## 2023-11-17 PROBLEM — S72.142A INTERTROCHANTERIC FRACTURE OF LEFT HIP, CLOSED, INITIAL ENCOUNTER (HCC): Status: ACTIVE | Noted: 2023-08-23

## 2023-11-17 PROBLEM — R53.1 GENERALIZED WEAKNESS: Status: RESOLVED | Noted: 2023-08-24 | Resolved: 2023-11-17

## 2023-11-17 ASSESSMENT — PATIENT HEALTH QUESTIONNAIRE - PHQ9
10. IF YOU CHECKED OFF ANY PROBLEMS, HOW DIFFICULT HAVE THESE PROBLEMS MADE IT FOR YOU TO DO YOUR WORK, TAKE CARE OF THINGS AT HOME, OR GET ALONG WITH OTHER PEOPLE: 0
6. FEELING BAD ABOUT YOURSELF - OR THAT YOU ARE A FAILURE OR HAVE LET YOURSELF OR YOUR FAMILY DOWN: 0
5. POOR APPETITE OR OVEREATING: 0
SUM OF ALL RESPONSES TO PHQ QUESTIONS 1-9: 0
1. LITTLE INTEREST OR PLEASURE IN DOING THINGS: 0
8. MOVING OR SPEAKING SO SLOWLY THAT OTHER PEOPLE COULD HAVE NOTICED. OR THE OPPOSITE, BEING SO FIGETY OR RESTLESS THAT YOU HAVE BEEN MOVING AROUND A LOT MORE THAN USUAL: 0
3. TROUBLE FALLING OR STAYING ASLEEP: 0
SUM OF ALL RESPONSES TO PHQ9 QUESTIONS 1 & 2: 0
9. THOUGHTS THAT YOU WOULD BE BETTER OFF DEAD, OR OF HURTING YOURSELF: 0
7. TROUBLE CONCENTRATING ON THINGS, SUCH AS READING THE NEWSPAPER OR WATCHING TELEVISION: 0
SUM OF ALL RESPONSES TO PHQ QUESTIONS 1-9: 0
2. FEELING DOWN, DEPRESSED OR HOPELESS: 0
SUM OF ALL RESPONSES TO PHQ QUESTIONS 1-9: 0
4. FEELING TIRED OR HAVING LITTLE ENERGY: 0
SUM OF ALL RESPONSES TO PHQ QUESTIONS 1-9: 0

## 2023-11-17 ASSESSMENT — LIFESTYLE VARIABLES
HOW OFTEN DO YOU HAVE A DRINK CONTAINING ALCOHOL: MONTHLY OR LESS
HOW MANY STANDARD DRINKS CONTAINING ALCOHOL DO YOU HAVE ON A TYPICAL DAY: 1 OR 2

## 2023-12-13 LAB — NONINV COLON CA DNA+OCC BLD SCRN STL QL: NEGATIVE

## 2024-01-01 PROBLEM — D64.89 OTHER SPECIFIED ANEMIAS: Status: ACTIVE | Noted: 2024-01-01

## 2024-01-02 ENCOUNTER — OFFICE VISIT (OUTPATIENT)
Dept: INTERNAL MEDICINE CLINIC | Facility: CLINIC | Age: 74
End: 2024-01-02
Payer: MEDICARE

## 2024-01-02 VITALS
HEIGHT: 65 IN | SYSTOLIC BLOOD PRESSURE: 127 MMHG | BODY MASS INDEX: 19.99 KG/M2 | DIASTOLIC BLOOD PRESSURE: 65 MMHG | WEIGHT: 120 LBS | HEART RATE: 64 BPM | RESPIRATION RATE: 18 BRPM | OXYGEN SATURATION: 95 %

## 2024-01-02 DIAGNOSIS — E03.9 ACQUIRED HYPOTHYROIDISM: ICD-10-CM

## 2024-01-02 DIAGNOSIS — I10 PRIMARY HYPERTENSION: Primary | ICD-10-CM

## 2024-01-02 DIAGNOSIS — Z71.89 GRIEF COUNSELING: ICD-10-CM

## 2024-01-02 DIAGNOSIS — D64.89 ANEMIA DUE TO OTHER CAUSE, NOT CLASSIFIED: ICD-10-CM

## 2024-01-02 DIAGNOSIS — D69.6 THROMBOCYTOPENIA (HCC): ICD-10-CM

## 2024-01-02 DIAGNOSIS — R04.0 BLEEDING NOSE: ICD-10-CM

## 2024-01-02 DIAGNOSIS — E78.1 HYPERTRIGLYCERIDEMIA: ICD-10-CM

## 2024-01-02 DIAGNOSIS — E55.9 VITAMIN D DEFICIENCY: ICD-10-CM

## 2024-01-02 DIAGNOSIS — Z23 NEED FOR VACCINATION: ICD-10-CM

## 2024-01-02 DIAGNOSIS — M81.0 AGE-RELATED OSTEOPOROSIS WITHOUT CURRENT PATHOLOGICAL FRACTURE: ICD-10-CM

## 2024-01-02 DIAGNOSIS — R63.4 WEIGHT LOSS: ICD-10-CM

## 2024-01-02 LAB
25(OH)D3 SERPL-MCNC: 28.6 NG/ML (ref 30–100)
ALBUMIN SERPL-MCNC: 4.3 G/DL (ref 3.2–4.6)
ALBUMIN/GLOB SERPL: 1.7 (ref 0.4–1.6)
ALP SERPL-CCNC: 114 U/L (ref 50–136)
ALT SERPL-CCNC: 25 U/L (ref 12–65)
ANION GAP SERPL CALC-SCNC: 2 MMOL/L (ref 2–11)
AST SERPL-CCNC: 28 U/L (ref 15–37)
BASOPHILS # BLD: 0 K/UL (ref 0–0.2)
BASOPHILS NFR BLD: 1 % (ref 0–2)
BILIRUB SERPL-MCNC: 0.7 MG/DL (ref 0.2–1.1)
BUN SERPL-MCNC: 14 MG/DL (ref 8–23)
CALCIUM SERPL-MCNC: 9.5 MG/DL (ref 8.3–10.4)
CHLORIDE SERPL-SCNC: 102 MMOL/L (ref 103–113)
CHOLEST SERPL-MCNC: 182 MG/DL
CO2 SERPL-SCNC: 30 MMOL/L (ref 21–32)
CREAT SERPL-MCNC: 0.8 MG/DL (ref 0.6–1)
DIFFERENTIAL METHOD BLD: ABNORMAL
EOSINOPHIL # BLD: 0.2 K/UL (ref 0–0.8)
EOSINOPHIL NFR BLD: 2 % (ref 0.5–7.8)
ERYTHROCYTE [DISTWIDTH] IN BLOOD BY AUTOMATED COUNT: 14.3 % (ref 11.9–14.6)
GLOBULIN SER CALC-MCNC: 2.6 G/DL (ref 2.8–4.5)
GLUCOSE SERPL-MCNC: 92 MG/DL (ref 65–100)
HCT VFR BLD AUTO: 37 % (ref 35.8–46.3)
HDLC SERPL-MCNC: 72 MG/DL (ref 40–60)
HDLC SERPL: 2.5
HGB BLD-MCNC: 11.9 G/DL (ref 11.7–15.4)
IMM GRANULOCYTES # BLD AUTO: 0 K/UL (ref 0–0.5)
IMM GRANULOCYTES NFR BLD AUTO: 0 % (ref 0–5)
LDLC SERPL CALC-MCNC: 64.6 MG/DL
LYMPHOCYTES # BLD: 1.2 K/UL (ref 0.5–4.6)
LYMPHOCYTES NFR BLD: 17 % (ref 13–44)
MCH RBC QN AUTO: 30.7 PG (ref 26.1–32.9)
MCHC RBC AUTO-ENTMCNC: 32.2 G/DL (ref 31.4–35)
MCV RBC AUTO: 95.4 FL (ref 82–102)
MONOCYTES # BLD: 0.6 K/UL (ref 0.1–1.3)
MONOCYTES NFR BLD: 9 % (ref 4–12)
NEUTS SEG # BLD: 5.2 K/UL (ref 1.7–8.2)
NEUTS SEG NFR BLD: 71 % (ref 43–78)
NRBC # BLD: 0 K/UL (ref 0–0.2)
PLATELET # BLD AUTO: 207 K/UL (ref 150–450)
PMV BLD AUTO: 11.1 FL (ref 9.4–12.3)
POTASSIUM SERPL-SCNC: 3.9 MMOL/L (ref 3.5–5.1)
PROT SERPL-MCNC: 6.9 G/DL (ref 6.3–8.2)
RBC # BLD AUTO: 3.88 M/UL (ref 4.05–5.2)
SODIUM SERPL-SCNC: 134 MMOL/L (ref 136–146)
TRIGL SERPL-MCNC: 227 MG/DL (ref 35–150)
TSH, 3RD GENERATION: 10 UIU/ML (ref 0.36–3.74)
VLDLC SERPL CALC-MCNC: 45.4 MG/DL (ref 6–23)
WBC # BLD AUTO: 7.2 K/UL (ref 4.3–11.1)

## 2024-01-02 PROCEDURE — 3078F DIAST BP <80 MM HG: CPT | Performed by: INTERNAL MEDICINE

## 2024-01-02 PROCEDURE — G8420 CALC BMI NORM PARAMETERS: HCPCS | Performed by: INTERNAL MEDICINE

## 2024-01-02 PROCEDURE — G8399 PT W/DXA RESULTS DOCUMENT: HCPCS | Performed by: INTERNAL MEDICINE

## 2024-01-02 PROCEDURE — 3017F COLORECTAL CA SCREEN DOC REV: CPT | Performed by: INTERNAL MEDICINE

## 2024-01-02 PROCEDURE — G8427 DOCREV CUR MEDS BY ELIG CLIN: HCPCS | Performed by: INTERNAL MEDICINE

## 2024-01-02 PROCEDURE — 99214 OFFICE O/P EST MOD 30 MIN: CPT | Performed by: INTERNAL MEDICINE

## 2024-01-02 PROCEDURE — 1090F PRES/ABSN URINE INCON ASSESS: CPT | Performed by: INTERNAL MEDICINE

## 2024-01-02 PROCEDURE — G8484 FLU IMMUNIZE NO ADMIN: HCPCS | Performed by: INTERNAL MEDICINE

## 2024-01-02 PROCEDURE — 3074F SYST BP LT 130 MM HG: CPT | Performed by: INTERNAL MEDICINE

## 2024-01-02 PROCEDURE — 1123F ACP DISCUSS/DSCN MKR DOCD: CPT | Performed by: INTERNAL MEDICINE

## 2024-01-02 PROCEDURE — 1036F TOBACCO NON-USER: CPT | Performed by: INTERNAL MEDICINE

## 2024-01-02 ASSESSMENT — PATIENT HEALTH QUESTIONNAIRE - PHQ9
SUM OF ALL RESPONSES TO PHQ9 QUESTIONS 1 & 2: 0
SUM OF ALL RESPONSES TO PHQ QUESTIONS 1-9: 0
2. FEELING DOWN, DEPRESSED OR HOPELESS: 0
SUM OF ALL RESPONSES TO PHQ QUESTIONS 1-9: 0
1. LITTLE INTEREST OR PLEASURE IN DOING THINGS: 0
SUM OF ALL RESPONSES TO PHQ QUESTIONS 1-9: 0
SUM OF ALL RESPONSES TO PHQ QUESTIONS 1-9: 0

## 2024-01-02 ASSESSMENT — ENCOUNTER SYMPTOMS
ABDOMINAL PAIN: 0
SHORTNESS OF BREATH: 0

## 2024-01-02 NOTE — PROGRESS NOTES
HPI    Past Medical History, Past Surgical History, Family history, Social History, and Medications were all reviewed with the patient today and updated as necessary.     She is now gaining weight and enjoying her smaller house and has girls in town.    She broke her hip when falling at Yoga.  She is now seeing Rivka Garner and    Current Outpatient Medications   Medication Sig Dispense Refill    sertraline (ZOLOFT) 100 MG tablet Take 1.5 tablets by mouth daily 135 tablet 0    Multiple Vitamins-Minerals (MULTIVITAMIN ADULTS PO) Take 1 tablet by mouth daily      aspirin 81 MG chewable tablet Take 1 tablet by mouth 2 times daily 60 tablet 0    acetaminophen (TYLENOL) 325 MG tablet Take 500 mg by mouth every 6 hours as needed for Pain      calcium carbonate (OSCAL) 500 MG TABS tablet Take 1 tablet by mouth daily      levothyroxine (SYNTHROID) 75 MCG tablet Take 1 tablet by mouth every morning (before breakfast) 90 tablet 3    lisinopril (ZESTRIL) 2.5 MG tablet Take 1 tablet by mouth daily 30 tablet 3    acyclovir (ZOVIRAX) 400 MG tablet Take 1 tablet by mouth 2 times daily      Cholecalciferol 50 MCG (2000 UT) CAPS Take 1 capsule by mouth 2 times daily      LORazepam (ATIVAN) 0.5 MG tablet Take 1 tablet by mouth every 8 hours as needed.      buPROPion (WELLBUTRIN XL) 150 MG extended release tablet TAKE 1 TABLET BY MOUTH ONCE DAILY IN THE MORNING (Patient not taking: Reported on 1/2/2024)       No current facility-administered medications for this visit.     Allergies   Allergen Reactions    Penicillins Hives    Sulfa Antibiotics Hives     Patient Active Problem List   Diagnosis    Alopecia    Dysthymia    Hypertriglyceridemia    Non-seasonal allergic rhinitis    Closed fracture of body of sternum with routine healing    Finger pain, right    Mild single current episode of major depressive disorder (HCC)    Urinary urgency    Sciatica    Recurrent genital herpes    Chronic insomnia    Excessive drinking alcohol

## 2024-01-05 ENCOUNTER — TELEPHONE (OUTPATIENT)
Dept: INTERNAL MEDICINE CLINIC | Facility: CLINIC | Age: 74
End: 2024-01-05

## 2024-01-05 DIAGNOSIS — E55.9 VITAMIN D DEFICIENCY: Primary | ICD-10-CM

## 2024-01-05 DIAGNOSIS — E03.9 ACQUIRED HYPOTHYROIDISM: ICD-10-CM

## 2024-01-05 RX ORDER — LEVOTHYROXINE SODIUM 0.07 MG/1
TABLET ORAL
Qty: 96 TABLET | Refills: 3 | Status: SHIPPED | OUTPATIENT
Start: 2024-01-05

## 2024-01-05 RX ORDER — CHOLECALCIFEROL (VITAMIN D3) 1250 MCG
1 CAPSULE ORAL WEEKLY
Qty: 12 CAPSULE | Refills: 0 | Status: SHIPPED | OUTPATIENT
Start: 2024-01-05

## 2024-01-05 RX ORDER — CHOLECALCIFEROL (VITAMIN D3) 1250 MCG
1 CAPSULE ORAL WEEKLY
COMMUNITY
End: 2024-01-05 | Stop reason: SDUPTHER

## 2024-01-05 NOTE — TELEPHONE ENCOUNTER
Pt was made aware of her lab work need to stop Vit D 2,000 units Qd and now take 50,000 units weekly x 12 weeks and repeat in 3 months Pt did said she is taking Synthroid 75 mcg QD and has not missed a dose. Pt is aware I will call her back to let her know what new dose [facility] will be sending in

## 2024-01-05 NOTE — TELEPHONE ENCOUNTER
Synthroid 75 mcg 1 tablet daily except 1.5 tablets on Mondays and Thursdays. Recheck TSH 3 months (ordered)

## 2024-01-05 NOTE — TELEPHONE ENCOUNTER
----- Message from AGBE Spivey CNP sent at 1/3/2024  5:35 PM EST -----  Regarding recent labs: Vitamin D is low and would like for her to take vit d2 39643 weekly for 12 weeks then recheck.   Also TSH is elevated. Please verify that she has been taking her Synthroid 75 mcg daily. If so, I am going to increase her daily dose.   Other labs look ok. Hemoglobin improved

## 2024-01-05 NOTE — TELEPHONE ENCOUNTER
Pt was made aware to increase her Synthroid and aware both Vit D and Synthroid has been sent in to her mail order

## 2024-01-15 ENCOUNTER — OFFICE VISIT (OUTPATIENT)
Dept: ENT CLINIC | Age: 74
End: 2024-01-15
Payer: MEDICARE

## 2024-01-15 VITALS
WEIGHT: 119.6 LBS | DIASTOLIC BLOOD PRESSURE: 68 MMHG | SYSTOLIC BLOOD PRESSURE: 128 MMHG | HEIGHT: 65 IN | BODY MASS INDEX: 19.93 KG/M2

## 2024-01-15 DIAGNOSIS — R04.0 EPISTAXIS: Primary | Chronic | ICD-10-CM

## 2024-01-15 PROCEDURE — 3017F COLORECTAL CA SCREEN DOC REV: CPT | Performed by: PHYSICIAN ASSISTANT

## 2024-01-15 PROCEDURE — 99203 OFFICE O/P NEW LOW 30 MIN: CPT | Performed by: PHYSICIAN ASSISTANT

## 2024-01-15 PROCEDURE — G8427 DOCREV CUR MEDS BY ELIG CLIN: HCPCS | Performed by: PHYSICIAN ASSISTANT

## 2024-01-15 PROCEDURE — G8420 CALC BMI NORM PARAMETERS: HCPCS | Performed by: PHYSICIAN ASSISTANT

## 2024-01-15 PROCEDURE — 1090F PRES/ABSN URINE INCON ASSESS: CPT | Performed by: PHYSICIAN ASSISTANT

## 2024-01-15 PROCEDURE — 3078F DIAST BP <80 MM HG: CPT | Performed by: PHYSICIAN ASSISTANT

## 2024-01-15 PROCEDURE — 1123F ACP DISCUSS/DSCN MKR DOCD: CPT | Performed by: PHYSICIAN ASSISTANT

## 2024-01-15 PROCEDURE — 3074F SYST BP LT 130 MM HG: CPT | Performed by: PHYSICIAN ASSISTANT

## 2024-01-15 PROCEDURE — 31231 NASAL ENDOSCOPY DX: CPT | Performed by: PHYSICIAN ASSISTANT

## 2024-01-15 PROCEDURE — G8484 FLU IMMUNIZE NO ADMIN: HCPCS | Performed by: PHYSICIAN ASSISTANT

## 2024-01-15 PROCEDURE — 1036F TOBACCO NON-USER: CPT | Performed by: PHYSICIAN ASSISTANT

## 2024-01-15 PROCEDURE — G8399 PT W/DXA RESULTS DOCUMENT: HCPCS | Performed by: PHYSICIAN ASSISTANT

## 2024-01-15 RX ORDER — ABALOPARATIDE 2000 UG/ML
80 INJECTION, SOLUTION SUBCUTANEOUS DAILY
COMMUNITY
Start: 2023-11-28

## 2024-01-15 ASSESSMENT — ENCOUNTER SYMPTOMS
GASTROINTESTINAL NEGATIVE: 1
RESPIRATORY NEGATIVE: 1
ALLERGIC/IMMUNOLOGIC NEGATIVE: 1
EYES NEGATIVE: 1

## 2024-01-15 NOTE — PROGRESS NOTES
Darby Kenny is a 73 y.o. female presents today with c/o epistaxis.  The right nostril frequently bleeds and episodes.  She will have days in which she does every day and will have weeks where she has none.  Duration also varies between a couple minutes to up to 30 minutes.  She has never had to go to the ED for control of epistaxis.  But it can vary from a slow drip to a gusher.  The patient treats it by laying down backwards and letting it dripped down her throat.  She has never had epistaxis because of vomiting.  Her last bleed was this morning it was only a small drip.  She denies any nasal trauma or surgeries she has no clotting or bleeding disorders.  She takes a daily aspirin but no other blood thinning medications.  She had a transient episode of hypertension after a hip fracture was controlled with medications for about a month now she no longer needs to take that medication her blood pressure has been well-controlled.  The patient orally hydrates ever since that fracture but does no other treatment.    Chief Complaint   Patient presents with    New Patient    Epistaxis     Patient presents for epistaxis.  Patient states she had a nosebleed this morning that was a steady drip.  Patient states they can last long or be short.  Blood only comes out of the right side.         Patient Active Problem List   Diagnosis    Alopecia    Dysthymia    Hypertriglyceridemia    Non-seasonal allergic rhinitis    Closed fracture of body of sternum with routine healing    Finger pain, right    Mild single current episode of major depressive disorder (HCC)    Urinary urgency    Sciatica    Recurrent genital herpes    Chronic insomnia    Excessive drinking alcohol    Weight loss    Osteoarthritis    Vitamin D deficiency    Anosmia    Acquired hypothyroidism    Grief    Synovial cyst of left knee    Anxiety    Sleep disorder    Lipoma of right lower extremity    Primary hypertension    S/p left hip fracture    At high risk

## 2024-01-15 NOTE — PATIENT INSTRUCTIONS
Epistaxis plan;     TO PREVENT NOSE BLEEDS:   Saline spray:  Brands like Aquaphor or AYR may be used as needed throughout the day to keep moisture in the nose. I recommend morning and evening.  This will prevent rebleeding and keep moisture in.   Aquaphor ointment:  This is an option to keep the inside of the nose hydrated.  It will prevent rebleeding and may be used before bed and/or in the mornings.  Humidification : vaporizer or humidifier in the bedroom, especially in dry weather.   Saline Rinse: twice daily NeilMed sinus rinse. Helpful to remember luke warm water, keep your mouth open, and lean forward when rinsing. Do not force the rinse up but apply steady pressure to the bottle. Repeat as often as necessary.     TO STOP A BLEED: Afrin nasal decongestant:  Use this nasal spray only in the event of an active bleed.  Two sprays each nostril during an active bleed.  Then Spray a cotton ball with Afrin (until it's wet) and place it up the nose on the bleeding side. Leave it in there for 15 min before taking it out. May also use ice water gargles or cold packs to the face to assist in stopping a bleed.  Do not use this on a regular basis, only when actively bleeding. If this does not help, call our office.     Do not blow your nose for 24 -48 hours and break through bleeds in the first week after cautery can be expected. After the first week, our goal is zero nosebleeds. If bleeding episodes continue, would recommend returning to clinic.

## 2024-03-18 DIAGNOSIS — F32.0 MAJOR DEPRESSIVE DISORDER, SINGLE EPISODE, MILD (HCC): ICD-10-CM

## 2024-03-18 RX ORDER — SERTRALINE HYDROCHLORIDE 100 MG/1
150 TABLET, FILM COATED ORAL DAILY
Qty: 135 TABLET | Refills: 1 | Status: SHIPPED | OUTPATIENT
Start: 2024-03-18

## 2024-04-26 LAB
25(OH)D3+25(OH)D2 SERPL-MCNC: 47.5 NG/ML (ref 30–100)
BASOPHILS # BLD AUTO: 0 X10E3/UL (ref 0–0.2)
BASOPHILS NFR BLD AUTO: 1 %
BUN SERPL-MCNC: 13 MG/DL (ref 8–27)
BUN/CREAT SERPL: 15 (ref 12–28)
CALCIUM SERPL-MCNC: 9.9 MG/DL (ref 8.7–10.3)
CHLORIDE SERPL-SCNC: 102 MMOL/L (ref 96–106)
CO2 SERPL-SCNC: 24 MMOL/L (ref 20–29)
CREAT SERPL-MCNC: 0.86 MG/DL (ref 0.57–1)
EGFRCR SERPLBLD CKD-EPI 2021: 71 ML/MIN/1.73
EOSINOPHIL # BLD AUTO: 0.2 X10E3/UL (ref 0–0.4)
EOSINOPHIL NFR BLD AUTO: 3 %
ERYTHROCYTE [DISTWIDTH] IN BLOOD BY AUTOMATED COUNT: 12.5 % (ref 11.7–15.4)
GLUCOSE SERPL-MCNC: 85 MG/DL (ref 70–99)
HCT VFR BLD AUTO: 38 % (ref 34–46.6)
HGB BLD-MCNC: 12.5 G/DL (ref 11.1–15.9)
IMM GRANULOCYTES # BLD AUTO: 0 X10E3/UL (ref 0–0.1)
IMM GRANULOCYTES NFR BLD AUTO: 0 %
LYMPHOCYTES # BLD AUTO: 1.3 X10E3/UL (ref 0.7–3.1)
LYMPHOCYTES NFR BLD AUTO: 19 %
MCH RBC QN AUTO: 31.6 PG (ref 26.6–33)
MCHC RBC AUTO-ENTMCNC: 32.9 G/DL (ref 31.5–35.7)
MCV RBC AUTO: 96 FL (ref 79–97)
MONOCYTES # BLD AUTO: 0.7 X10E3/UL (ref 0.1–0.9)
MONOCYTES NFR BLD AUTO: 10 %
NEUTROPHILS # BLD AUTO: 4.6 X10E3/UL (ref 1.4–7)
NEUTROPHILS NFR BLD AUTO: 67 %
PLATELET # BLD AUTO: 185 X10E3/UL (ref 150–450)
POTASSIUM SERPL-SCNC: 4.6 MMOL/L (ref 3.5–5.2)
RBC # BLD AUTO: 3.95 X10E6/UL (ref 3.77–5.28)
SODIUM SERPL-SCNC: 141 MMOL/L (ref 134–144)
T4 FREE SERPL-MCNC: 1.59 NG/DL (ref 0.82–1.77)
TSH SERPL DL<=0.005 MIU/L-ACNC: 4.68 UIU/ML (ref 0.45–4.5)
WBC # BLD AUTO: 6.8 X10E3/UL (ref 3.4–10.8)

## 2024-05-06 DIAGNOSIS — E03.9 ACQUIRED HYPOTHYROIDISM: ICD-10-CM

## 2024-05-06 RX ORDER — LEVOTHYROXINE SODIUM 88 UG/1
88 TABLET ORAL DAILY
Qty: 90 TABLET | Refills: 3 | Status: SHIPPED | OUTPATIENT
Start: 2024-05-06 | End: 2025-05-01

## 2024-05-07 DIAGNOSIS — F32.0 MAJOR DEPRESSIVE DISORDER, SINGLE EPISODE, MILD (HCC): ICD-10-CM

## 2024-05-07 RX ORDER — ACYCLOVIR 400 MG/1
400 TABLET ORAL 2 TIMES DAILY
Qty: 180 TABLET | Refills: 1 | Status: SHIPPED | OUTPATIENT
Start: 2024-05-07

## 2024-05-07 RX ORDER — SERTRALINE HYDROCHLORIDE 100 MG/1
150 TABLET, FILM COATED ORAL DAILY
Qty: 135 TABLET | Refills: 1 | Status: SHIPPED | OUTPATIENT
Start: 2024-05-07

## 2024-05-07 NOTE — TELEPHONE ENCOUNTER
----- Message from Vidya Cerda-MICHAEL Suggs sent at 5/7/2024  2:27 PM EDT -----  Subject: Refill Request    QUESTIONS  Name of Medication? Other - acyclovir (ZOVIRAX) 400 MG tablet  Patient-reported dosage and instructions? 400 MG BID  How many days do you have left? 0  Preferred Pharmacy? [facility]  Pharmacy phone number (if available)? [phone]  ---------------------------------------------------------------------------  --------------  CALL BACK INFO  What is the best way for the office to contact you? Do not leave any   message, patient will call back for answer  Preferred Call Back Phone Number? [phone]  ---------------------------------------------------------------------------  --------------  SCRIPT ANSWERS  Relationship to Patient? Self

## 2024-07-03 ENCOUNTER — OFFICE VISIT (OUTPATIENT)
Dept: INTERNAL MEDICINE CLINIC | Facility: CLINIC | Age: 74
End: 2024-07-03
Payer: MEDICARE

## 2024-07-03 VITALS
SYSTOLIC BLOOD PRESSURE: 148 MMHG | DIASTOLIC BLOOD PRESSURE: 82 MMHG | WEIGHT: 117 LBS | HEIGHT: 65 IN | OXYGEN SATURATION: 97 % | HEART RATE: 87 BPM | BODY MASS INDEX: 19.49 KG/M2

## 2024-07-03 DIAGNOSIS — E03.9 ACQUIRED HYPOTHYROIDISM: ICD-10-CM

## 2024-07-03 DIAGNOSIS — M81.0 AGE-RELATED OSTEOPOROSIS WITHOUT CURRENT PATHOLOGICAL FRACTURE: ICD-10-CM

## 2024-07-03 DIAGNOSIS — I10 PRIMARY HYPERTENSION: Primary | ICD-10-CM

## 2024-07-03 DIAGNOSIS — A60.00 RECURRENT GENITAL HERPES: ICD-10-CM

## 2024-07-03 DIAGNOSIS — F33.42 RECURRENT MAJOR DEPRESSIVE DISORDER, IN FULL REMISSION (HCC): ICD-10-CM

## 2024-07-03 DIAGNOSIS — N39.41 URGE INCONTINENCE OF URINE: ICD-10-CM

## 2024-07-03 DIAGNOSIS — R42 DIZZINESS: ICD-10-CM

## 2024-07-03 DIAGNOSIS — F32.0 MAJOR DEPRESSIVE DISORDER, SINGLE EPISODE, MILD (HCC): ICD-10-CM

## 2024-07-03 PROBLEM — D69.6 THROMBOCYTOPENIA (HCC): Status: RESOLVED | Noted: 2023-08-24 | Resolved: 2024-07-03

## 2024-07-03 PROBLEM — D17.23 LIPOMA OF RIGHT LOWER EXTREMITY: Status: RESOLVED | Noted: 2020-03-27 | Resolved: 2024-07-03

## 2024-07-03 LAB
ALBUMIN SERPL-MCNC: 4.3 G/DL (ref 3.2–4.6)
ALBUMIN/GLOB SERPL: 1.6 (ref 1–1.9)
ALP SERPL-CCNC: 90 U/L (ref 35–104)
ALT SERPL-CCNC: 8 U/L (ref 12–65)
ANION GAP SERPL CALC-SCNC: 10 MMOL/L (ref 9–18)
AST SERPL-CCNC: 21 U/L (ref 15–37)
BASOPHILS # BLD: 0.1 K/UL (ref 0–0.2)
BASOPHILS NFR BLD: 1 % (ref 0–2)
BILIRUB SERPL-MCNC: 0.5 MG/DL (ref 0–1.2)
BUN SERPL-MCNC: 14 MG/DL (ref 8–23)
CALCIUM SERPL-MCNC: 10.8 MG/DL (ref 8.8–10.2)
CHLORIDE SERPL-SCNC: 97 MMOL/L (ref 98–107)
CHOLEST SERPL-MCNC: 208 MG/DL (ref 0–200)
CO2 SERPL-SCNC: 28 MMOL/L (ref 20–28)
CREAT SERPL-MCNC: 0.87 MG/DL (ref 0.6–1.1)
DIFFERENTIAL METHOD BLD: NORMAL
EOSINOPHIL # BLD: 0.3 K/UL (ref 0–0.8)
EOSINOPHIL NFR BLD: 4 % (ref 0.5–7.8)
ERYTHROCYTE [DISTWIDTH] IN BLOOD BY AUTOMATED COUNT: 12.8 % (ref 11.9–14.6)
GLOBULIN SER CALC-MCNC: 2.6 G/DL (ref 2.3–3.5)
GLUCOSE SERPL-MCNC: 93 MG/DL (ref 70–99)
HCT VFR BLD AUTO: 38.7 % (ref 35.8–46.3)
HDLC SERPL-MCNC: 79 MG/DL (ref 40–60)
HDLC SERPL: 2.6 (ref 0–5)
HGB BLD-MCNC: 13 G/DL (ref 11.7–15.4)
IMM GRANULOCYTES # BLD AUTO: 0 K/UL (ref 0–0.5)
IMM GRANULOCYTES NFR BLD AUTO: 0 % (ref 0–5)
LDLC SERPL CALC-MCNC: 103 MG/DL (ref 0–100)
LYMPHOCYTES # BLD: 1.2 K/UL (ref 0.5–4.6)
LYMPHOCYTES NFR BLD: 16 % (ref 13–44)
MCH RBC QN AUTO: 30.8 PG (ref 26.1–32.9)
MCHC RBC AUTO-ENTMCNC: 33.6 G/DL (ref 31.4–35)
MCV RBC AUTO: 91.7 FL (ref 82–102)
MONOCYTES # BLD: 0.7 K/UL (ref 0.1–1.3)
MONOCYTES NFR BLD: 9 % (ref 4–12)
NEUTS SEG # BLD: 5.5 K/UL (ref 1.7–8.2)
NEUTS SEG NFR BLD: 70 % (ref 43–78)
NRBC # BLD: 0 K/UL (ref 0–0.2)
PLATELET # BLD AUTO: 227 K/UL (ref 150–450)
PMV BLD AUTO: 10.9 FL (ref 9.4–12.3)
POTASSIUM SERPL-SCNC: 4.4 MMOL/L (ref 3.5–5.1)
PROT SERPL-MCNC: 7 G/DL (ref 6.3–8.2)
RBC # BLD AUTO: 4.22 M/UL (ref 4.05–5.2)
SODIUM SERPL-SCNC: 135 MMOL/L (ref 136–145)
TRIGL SERPL-MCNC: 128 MG/DL (ref 0–150)
TSH W FREE THYROID IF ABNORMAL: 3.68 UIU/ML (ref 0.27–4.2)
VLDLC SERPL CALC-MCNC: 26 MG/DL (ref 6–23)
WBC # BLD AUTO: 7.8 K/UL (ref 4.3–11.1)

## 2024-07-03 PROCEDURE — 1036F TOBACCO NON-USER: CPT | Performed by: STUDENT IN AN ORGANIZED HEALTH CARE EDUCATION/TRAINING PROGRAM

## 2024-07-03 PROCEDURE — 0509F URINE INCON PLAN DOCD: CPT | Performed by: STUDENT IN AN ORGANIZED HEALTH CARE EDUCATION/TRAINING PROGRAM

## 2024-07-03 PROCEDURE — 1123F ACP DISCUSS/DSCN MKR DOCD: CPT | Performed by: STUDENT IN AN ORGANIZED HEALTH CARE EDUCATION/TRAINING PROGRAM

## 2024-07-03 PROCEDURE — 3077F SYST BP >= 140 MM HG: CPT | Performed by: STUDENT IN AN ORGANIZED HEALTH CARE EDUCATION/TRAINING PROGRAM

## 2024-07-03 PROCEDURE — 3017F COLORECTAL CA SCREEN DOC REV: CPT | Performed by: STUDENT IN AN ORGANIZED HEALTH CARE EDUCATION/TRAINING PROGRAM

## 2024-07-03 PROCEDURE — G8420 CALC BMI NORM PARAMETERS: HCPCS | Performed by: STUDENT IN AN ORGANIZED HEALTH CARE EDUCATION/TRAINING PROGRAM

## 2024-07-03 PROCEDURE — G8427 DOCREV CUR MEDS BY ELIG CLIN: HCPCS | Performed by: STUDENT IN AN ORGANIZED HEALTH CARE EDUCATION/TRAINING PROGRAM

## 2024-07-03 PROCEDURE — 3079F DIAST BP 80-89 MM HG: CPT | Performed by: STUDENT IN AN ORGANIZED HEALTH CARE EDUCATION/TRAINING PROGRAM

## 2024-07-03 PROCEDURE — 99214 OFFICE O/P EST MOD 30 MIN: CPT | Performed by: STUDENT IN AN ORGANIZED HEALTH CARE EDUCATION/TRAINING PROGRAM

## 2024-07-03 PROCEDURE — 1090F PRES/ABSN URINE INCON ASSESS: CPT | Performed by: STUDENT IN AN ORGANIZED HEALTH CARE EDUCATION/TRAINING PROGRAM

## 2024-07-03 PROCEDURE — G8399 PT W/DXA RESULTS DOCUMENT: HCPCS | Performed by: STUDENT IN AN ORGANIZED HEALTH CARE EDUCATION/TRAINING PROGRAM

## 2024-07-03 RX ORDER — LEVOTHYROXINE SODIUM 0.07 MG/1
TABLET ORAL
Qty: 1 TABLET | Refills: 0
Start: 2024-07-03

## 2024-07-03 RX ORDER — CHOLECALCIFEROL (VITAMIN D3) 1250 MCG
5000 CAPSULE ORAL 2 TIMES DAILY
COMMUNITY

## 2024-07-03 RX ORDER — ACYCLOVIR 400 MG/1
400 TABLET ORAL 2 TIMES DAILY
Qty: 180 TABLET | Refills: 3 | Status: SHIPPED | OUTPATIENT
Start: 2024-07-03

## 2024-07-03 RX ORDER — SERTRALINE HYDROCHLORIDE 100 MG/1
150 TABLET, FILM COATED ORAL DAILY
Qty: 135 TABLET | Refills: 3 | Status: SHIPPED | OUTPATIENT
Start: 2024-07-03

## 2024-07-03 RX ORDER — LEVOTHYROXINE SODIUM 0.07 MG/1
TABLET ORAL
COMMUNITY
End: 2024-07-03 | Stop reason: SDUPTHER

## 2024-07-03 RX ORDER — IBUPROFEN 200 MG
200 TABLET ORAL DAILY PRN
COMMUNITY

## 2024-07-03 SDOH — ECONOMIC STABILITY: INCOME INSECURITY: HOW HARD IS IT FOR YOU TO PAY FOR THE VERY BASICS LIKE FOOD, HOUSING, MEDICAL CARE, AND HEATING?: NOT HARD AT ALL

## 2024-07-03 SDOH — ECONOMIC STABILITY: FOOD INSECURITY: WITHIN THE PAST 12 MONTHS, THE FOOD YOU BOUGHT JUST DIDN'T LAST AND YOU DIDN'T HAVE MONEY TO GET MORE.: NEVER TRUE

## 2024-07-03 SDOH — ECONOMIC STABILITY: FOOD INSECURITY: WITHIN THE PAST 12 MONTHS, YOU WORRIED THAT YOUR FOOD WOULD RUN OUT BEFORE YOU GOT MONEY TO BUY MORE.: NEVER TRUE

## 2024-07-03 ASSESSMENT — PATIENT HEALTH QUESTIONNAIRE - PHQ9
SUM OF ALL RESPONSES TO PHQ QUESTIONS 1-9: 0
1. LITTLE INTEREST OR PLEASURE IN DOING THINGS: NOT AT ALL
SUM OF ALL RESPONSES TO PHQ9 QUESTIONS 1 & 2: 0
SUM OF ALL RESPONSES TO PHQ QUESTIONS 1-9: 0
2. FEELING DOWN, DEPRESSED OR HOPELESS: NOT AT ALL
SUM OF ALL RESPONSES TO PHQ QUESTIONS 1-9: 0
SUM OF ALL RESPONSES TO PHQ QUESTIONS 1-9: 0

## 2024-07-04 NOTE — PROGRESS NOTES
SUBJECTIVE:   aDrby Kenny is a 74 y.o. female seen for a visit regarding   Chief Complaint   Patient presents with    Thyroid Problem     Here to get established. Previous pt of Dr. Marcus.    Incontinence     Has noticed some urinary incontinence and seems to be getting worse.    Osteoporosis     Wants to discuss her osteoporosis and treatment options        HPI:     Prior PCP is retired and this is the first time I am seeing her today.    Osteoporosis: severe with lowest T score -3.8, Seeing Rikva Garner and on Tymlos    Lightheadedness not presyncopal worse with certain head positions    Asking about dementia, patient pays her own bills and doesn't get lost in neighborhood    Urinary incontinence: when she sits up/stands will have sudden urge to urinate. NO stress incontinence. Symptoms ongoing for a few years.  Has tried kegels however not sure if doing this correctly. No burning urination/UTI symptoms.  Hypothyroidism: Has been taking levothyroxine 75 mcg daily three days of the week takes 1.5 tabs and takes 1 tab the other days  HTN: BP was high today because traffic. Hasn't been checking BP at home. was on lisinopril but was stopped  Depression: since  passed 2019 from ALS  Anxiety: takes ativan in some situations  Takes ibuprofen every day for hip pain, seeing ortho    Past Medical History, Past Surgical History, Family history, Social History, and Medications were all reviewed with the patient today and updated as necessary.     Current Outpatient Medications   Medication Sig Dispense Refill    Cholecalciferol (VITAMIN D3) 1.25 MG (76903 UT) CAPS Take 5,000 Units by mouth 2 times daily      ibuprofen (ADVIL;MOTRIN) 200 MG tablet Take 1 tablet by mouth daily as needed for Pain      acyclovir (ZOVIRAX) 400 MG tablet Take 1 tablet by mouth 2 times daily 180 tablet 3    sertraline (ZOLOFT) 100 MG tablet Take 1.5 tablets by mouth daily 135 tablet 3    levothyroxine (SYNTHROID) 75 MCG tablet 1.5

## 2024-07-05 ENCOUNTER — TELEPHONE (OUTPATIENT)
Dept: INTERNAL MEDICINE CLINIC | Facility: CLINIC | Age: 74
End: 2024-07-05

## 2024-07-05 NOTE — TELEPHONE ENCOUNTER
Patient notified per Dr. Olivas Calcium is high, I recommend taking lower dose of calcium supplement, instead 1200 mg take 600 mg daily and please forward results to Rivka Garner with [facility].  verbalized understanding

## 2024-07-07 DIAGNOSIS — E83.52 HYPERCALCEMIA: Primary | ICD-10-CM

## 2024-07-22 ENCOUNTER — TELEPHONE (OUTPATIENT)
Dept: INTERNAL MEDICINE CLINIC | Facility: CLINIC | Age: 74
End: 2024-07-22

## 2024-07-22 NOTE — TELEPHONE ENCOUNTER
Received notification that pt has not read [employer] message.    This message is to inform you that the patient has not yet read the following message. (Notification date: July 22, 2024)      lab result    From  Solis Hagan LPN To  Darby Kenny Sent and Delivered  7/8/2024 11:12 AM         Dr Olivas said that it;  Would also be good to recheck calcium in a month.   The lab order is in and  An appointment is not needed for blood work. The lab is open, on the first floor, room 170, Mon-Fri 7:00 am-4:00 pm.          Please keep your follow up as scheduled and bring all current medications to the visit.   Please arrive 10 minutes before your appointment time for check in at the .     Thank you,  Solis PETERSON        [employer] [place]    [employer] User Last Read On   Darby Kenny Not Read

## 2024-08-01 DIAGNOSIS — E83.52 HYPERCALCEMIA: ICD-10-CM

## 2024-08-01 LAB
ANION GAP SERPL CALC-SCNC: 11 MMOL/L (ref 9–18)
BUN SERPL-MCNC: 16 MG/DL (ref 8–23)
CALCIUM SERPL-MCNC: 10.5 MG/DL (ref 8.8–10.2)
CHLORIDE SERPL-SCNC: 97 MMOL/L (ref 98–107)
CO2 SERPL-SCNC: 29 MMOL/L (ref 20–28)
CREAT SERPL-MCNC: 0.88 MG/DL (ref 0.6–1.1)
GLUCOSE SERPL-MCNC: 89 MG/DL (ref 70–99)
POTASSIUM SERPL-SCNC: 4.4 MMOL/L (ref 3.5–5.1)
SODIUM SERPL-SCNC: 136 MMOL/L (ref 136–145)

## 2024-08-02 DIAGNOSIS — E83.52 HYPERCALCEMIA: Primary | ICD-10-CM

## (undated) DEVICE — SUTURE ETHBND EXCEL SZ 3-0 L30IN NONABSORBABLE GRN L26MM SH X832H

## (undated) DEVICE — DISPOSABLE TOURNIQUET CUFF SINGLE BLADDER, DUAL PORT AND QUICK CONNECT CONNECTOR: Brand: COLOR CUFF

## (undated) DEVICE — BLADE SURG 90DEG BVL UP LAMLLR

## (undated) DEVICE — PADDING CAST W2INXL4YD ST COT COHESIVE HND TEARABLE SPEC

## (undated) DEVICE — DRAPE XR C ARM 41X74IN LF --

## (undated) DEVICE — Device

## (undated) DEVICE — INTENDED FOR TISSUE SEPARATION, AND OTHER PROCEDURES THAT REQUIRE A SHARP SURGICAL BLADE TO PUNCTURE OR CUT.: Brand: BARD-PARKER ® STAINLESS STEEL BLADES

## (undated) DEVICE — SUTURE NONABSORBABLE MONOFILAMENT 4-0 PS-2 18 IN BLU PROLENE 8682H

## (undated) DEVICE — STERILE HOOK LOCK LATEX FREE ELASTIC BANDAGE 3INX5YD: Brand: HOOK LOCK™

## (undated) DEVICE — SUT ETHLN 2 30IN LR DA BLK --

## (undated) DEVICE — BIPOLAR FORCEPS CORD,BANANA LEADS: Brand: VALLEYLAB

## (undated) DEVICE — RETRIEVER SUT ARTHSCP HOFFEE --

## (undated) DEVICE — SUTURE MCRYL SZ 4-0 L27IN ABSRB UD L19MM PS-2 1/2 CIR PRIM Y426H

## (undated) DEVICE — (D)PREP SKN CHLRAPRP APPL 26ML -- CONVERT TO ITEM 371833

## (undated) DEVICE — SPLINT THMB W3XL12IN FBRGLS PD PRECUT LTWT DURABLE FAST SET

## (undated) DEVICE — SYRINGE EAR 2OZ ULC SLIMMER TIP FLAT BTM SUCT PWR DISP FOR

## (undated) DEVICE — BLADE OPHTH 180DEG CUT SURF BLU STR SHRP DBL BVL GRINDLESS

## (undated) DEVICE — SOLUTION IV 1000ML 0.9% SOD CHL

## (undated) DEVICE — SLING ARM 2-37INX8-17IN PCH -- MED

## (undated) DEVICE — DERMABOND SKIN ADH 0.7ML -- DERMABOND ADVANCED 12/BX

## (undated) DEVICE — TRAP TRAC M FNGR MESH SGL COLOR-CODED MX MTCH DISP

## (undated) DEVICE — AMD ANTIMICROBIAL GAUZE SPONGES,12 PLY USP TYPE VII, 0.2% POLYHEXAMETHYLENE BIGUANIDE HCI (PHMB): Brand: CURITY

## (undated) DEVICE — CAP PROTCT GRN REFIL FOR 0.054-0.062IN WIRE W SER PIN BALL

## (undated) DEVICE — STERILE HOOK LOCK LATEX FREE ELASTIC BANDAGE 2INX5YD: Brand: HOOK LOCK™

## (undated) DEVICE — PADDING CAST COHESIVE 4 YDX3 IN HND TEARABLE COTTON SPEC 100

## (undated) DEVICE — SUTURE ETHBND EXCEL SZ 3-0 L36IN NONABSORBABLE GRN RB-1 X558H

## (undated) DEVICE — DRESSING,GAUZE,XEROFORM,CURAD,1"X8",ST: Brand: CURAD